# Patient Record
Sex: MALE | Race: WHITE | NOT HISPANIC OR LATINO | Employment: UNEMPLOYED | URBAN - METROPOLITAN AREA
[De-identification: names, ages, dates, MRNs, and addresses within clinical notes are randomized per-mention and may not be internally consistent; named-entity substitution may affect disease eponyms.]

---
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
## 2020-03-14 ENCOUNTER — NURSE TRIAGE (OUTPATIENT)
Dept: ADMINISTRATIVE | Facility: CLINIC | Age: 20
End: 2020-03-14

## 2020-03-14 NOTE — TELEPHONE ENCOUNTER
Spoke with pt:        Reason for Disposition   No answer.  First attempt to contact caller.  Follow-up call scheduled within 15 minutes.   Message left on unidentified voice mail.  Phone number verified.    Additional Information   Negative: Caller is angry or rude (e.g., hangs up, verbally abusive, yelling)   Negative: Caller hangs up   Negative: Caller has already spoken with the PCP and has no further questions.   Negative: Caller has already spoken with another triager and has no further questions.   Negative: Caller has already spoken with another triager or PCP AND has further questions AND triager able to answer questions.    Protocols used: NO CONTACT OR DUPLICATE CONTACT CALL-A-

## 2020-09-23 ENCOUNTER — OFFICE VISIT (OUTPATIENT)
Dept: INTERNAL MEDICINE | Facility: CLINIC | Age: 20
End: 2020-09-23
Payer: MEDICAID

## 2020-09-23 VITALS
HEART RATE: 81 BPM | HEIGHT: 69 IN | DIASTOLIC BLOOD PRESSURE: 84 MMHG | OXYGEN SATURATION: 98 % | BODY MASS INDEX: 23.71 KG/M2 | SYSTOLIC BLOOD PRESSURE: 126 MMHG | WEIGHT: 160.06 LBS | TEMPERATURE: 98 F | RESPIRATION RATE: 20 BRPM

## 2020-09-23 DIAGNOSIS — F41.9 ANXIETY: ICD-10-CM

## 2020-09-23 DIAGNOSIS — F90.8 ADHD, ADULT RESIDUAL TYPE: ICD-10-CM

## 2020-09-23 DIAGNOSIS — F84.5 ASPERGER SYNDROME: Primary | ICD-10-CM

## 2020-09-23 PROCEDURE — 99999 PR PBB SHADOW E&M-EST. PATIENT-LVL IV: CPT | Mod: PBBFAC,,, | Performed by: NURSE PRACTITIONER

## 2020-09-23 PROCEDURE — 99214 OFFICE O/P EST MOD 30 MIN: CPT | Mod: PBBFAC,PN | Performed by: NURSE PRACTITIONER

## 2020-09-23 PROCEDURE — 99204 PR OFFICE/OUTPT VISIT, NEW, LEVL IV, 45-59 MIN: ICD-10-PCS | Mod: S$PBB,,, | Performed by: NURSE PRACTITIONER

## 2020-09-23 PROCEDURE — 99204 OFFICE O/P NEW MOD 45 MIN: CPT | Mod: S$PBB,,, | Performed by: NURSE PRACTITIONER

## 2020-09-23 PROCEDURE — 99999 PR PBB SHADOW E&M-EST. PATIENT-LVL IV: ICD-10-PCS | Mod: PBBFAC,,, | Performed by: NURSE PRACTITIONER

## 2020-09-23 RX ORDER — FLUOXETINE HYDROCHLORIDE 40 MG/1
40 CAPSULE ORAL DAILY
Qty: 30 CAPSULE | Refills: 2 | Status: ON HOLD | OUTPATIENT
Start: 2020-09-23 | End: 2020-10-06 | Stop reason: SDUPTHER

## 2020-09-23 RX ORDER — CLONIDINE HYDROCHLORIDE 0.1 MG/1
1 TABLET, EXTENDED RELEASE ORAL NIGHTLY
Qty: 30 TABLET | Refills: 2 | Status: ON HOLD | OUTPATIENT
Start: 2020-09-23 | End: 2020-10-04
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
## 2020-10-03 ENCOUNTER — HOSPITAL ENCOUNTER (INPATIENT)
Facility: HOSPITAL | Age: 20
LOS: 4 days | Discharge: HOME | DRG: 885 | End: 2020-10-07
Attending: PSYCHIATRY & NEUROLOGY | Admitting: PSYCHIATRY & NEUROLOGY
Payer: MEDICAID

## 2020-10-03 DIAGNOSIS — F32.A DEPRESSION WITH SUICIDAL IDEATION: ICD-10-CM

## 2020-10-03 DIAGNOSIS — F41.9 ANXIETY: ICD-10-CM

## 2020-10-03 DIAGNOSIS — F84.5 ASPERGER SYNDROME: ICD-10-CM

## 2020-10-03 DIAGNOSIS — R45.851 DEPRESSION WITH SUICIDAL IDEATION: ICD-10-CM

## 2020-10-03 DIAGNOSIS — F33.2 MDD (MAJOR DEPRESSIVE DISORDER), RECURRENT SEVERE, WITHOUT PSYCHOSIS: Primary | ICD-10-CM

## 2020-10-03 PROCEDURE — 11400000 HC PSYCH PRIVATE ROOM

## 2020-10-03 PROCEDURE — 80061 LIPID PANEL: CPT

## 2020-10-03 PROCEDURE — 83036 HEMOGLOBIN GLYCOSYLATED A1C: CPT

## 2020-10-04 PROBLEM — R45.851 DEPRESSION WITH SUICIDAL IDEATION: Status: ACTIVE | Noted: 2020-10-04

## 2020-10-04 PROBLEM — F32.A DEPRESSION WITH SUICIDAL IDEATION: Status: ACTIVE | Noted: 2020-10-04

## 2020-10-04 PROBLEM — F33.2 MDD (MAJOR DEPRESSIVE DISORDER), RECURRENT SEVERE, WITHOUT PSYCHOSIS: Status: ACTIVE | Noted: 2020-10-04

## 2020-10-04 LAB
CHOLEST SERPL-MCNC: 135 MG/DL (ref 120–199)
CHOLEST/HDLC SERPL: 3.3 {RATIO} (ref 2–5)
ESTIMATED AVG GLUCOSE: 91 MG/DL (ref 68–131)
HBA1C MFR BLD HPLC: 4.8 % (ref 4–5.6)
HDLC SERPL-MCNC: 41 MG/DL (ref 40–75)
HDLC SERPL: 30.4 % (ref 20–50)
LDLC SERPL CALC-MCNC: 85.8 MG/DL (ref 63–159)
NONHDLC SERPL-MCNC: 94 MG/DL
TRIGL SERPL-MCNC: 41 MG/DL (ref 30–150)

## 2020-10-04 PROCEDURE — 25000003 PHARM REV CODE 250: Performed by: PSYCHIATRY & NEUROLOGY

## 2020-10-04 PROCEDURE — 99231 PR SUBSEQUENT HOSPITAL CARE,LEVL I: ICD-10-PCS | Mod: ,,, | Performed by: FAMILY MEDICINE

## 2020-10-04 PROCEDURE — 36415 COLL VENOUS BLD VENIPUNCTURE: CPT

## 2020-10-04 PROCEDURE — 99231 SBSQ HOSP IP/OBS SF/LOW 25: CPT | Mod: ,,, | Performed by: FAMILY MEDICINE

## 2020-10-04 PROCEDURE — 11400000 HC PSYCH PRIVATE ROOM

## 2020-10-04 RX ORDER — MAG HYDROX/ALUMINUM HYD/SIMETH 200-200-20
30 SUSPENSION, ORAL (FINAL DOSE FORM) ORAL EVERY 6 HOURS PRN
Status: DISCONTINUED | OUTPATIENT
Start: 2020-10-04 | End: 2020-10-07 | Stop reason: HOSPADM

## 2020-10-04 RX ORDER — OLANZAPINE 10 MG/2ML
10 INJECTION, POWDER, FOR SOLUTION INTRAMUSCULAR EVERY 4 HOURS PRN
Status: DISCONTINUED | OUTPATIENT
Start: 2020-10-04 | End: 2020-10-07 | Stop reason: HOSPADM

## 2020-10-04 RX ORDER — HYDROXYZINE PAMOATE 50 MG/1
50 CAPSULE ORAL EVERY 6 HOURS PRN
Status: DISCONTINUED | OUTPATIENT
Start: 2020-10-04 | End: 2020-10-07 | Stop reason: HOSPADM

## 2020-10-04 RX ORDER — IBUPROFEN 200 MG
1 TABLET ORAL DAILY PRN
Status: DISCONTINUED | OUTPATIENT
Start: 2020-10-04 | End: 2020-10-07 | Stop reason: HOSPADM

## 2020-10-04 RX ORDER — ACETAMINOPHEN 325 MG/1
650 TABLET ORAL EVERY 6 HOURS PRN
Status: DISCONTINUED | OUTPATIENT
Start: 2020-10-04 | End: 2020-10-07 | Stop reason: HOSPADM

## 2020-10-04 RX ORDER — LOPERAMIDE HYDROCHLORIDE 2 MG/1
2 CAPSULE ORAL
Status: DISCONTINUED | OUTPATIENT
Start: 2020-10-04 | End: 2020-10-07 | Stop reason: HOSPADM

## 2020-10-04 RX ORDER — DOCUSATE SODIUM 100 MG/1
100 CAPSULE, LIQUID FILLED ORAL DAILY PRN
Status: DISCONTINUED | OUTPATIENT
Start: 2020-10-04 | End: 2020-10-07 | Stop reason: HOSPADM

## 2020-10-04 RX ORDER — FLUOXETINE HYDROCHLORIDE 20 MG/1
40 CAPSULE ORAL DAILY
Status: DISCONTINUED | OUTPATIENT
Start: 2020-10-04 | End: 2020-10-07 | Stop reason: HOSPADM

## 2020-10-04 RX ORDER — FOLIC ACID 1 MG/1
1 TABLET ORAL DAILY
Status: DISCONTINUED | OUTPATIENT
Start: 2020-10-04 | End: 2020-10-07 | Stop reason: HOSPADM

## 2020-10-04 RX ORDER — OLANZAPINE 10 MG/1
10 TABLET ORAL EVERY 4 HOURS PRN
Status: DISCONTINUED | OUTPATIENT
Start: 2020-10-04 | End: 2020-10-07 | Stop reason: HOSPADM

## 2020-10-04 RX ADMIN — HYDROXYZINE PAMOATE 50 MG: 50 CAPSULE ORAL at 09:10

## 2020-10-04 RX ADMIN — FLUOXETINE HYDROCHLORIDE 40 MG: 20 CAPSULE ORAL at 09:10

## 2020-10-04 RX ADMIN — THERA TABS 1 TABLET: TAB at 09:10

## 2020-10-04 RX ADMIN — FOLIC ACID 1 MG: 1 TABLET ORAL at 09:10

## 2020-10-04 NOTE — H&P
"PSYCHIATRY INPATIENT ADMISSION NOTE - H & P      10/4/2020 8:23 AM   Fidel Nelson   2000   [id number]           DATE OF ADMISSION: 10/3/2020 11:05 PM    SITE: [facility]    CURRENT LEGAL STATUS: PEC and/or CEC      HISTORY    CHIEF COMPLAINT   Fidel Nelson is a 20 y.o. male with a past psychiatric history of depression, anxiety, ADHD, OCD, and Aspergers, currently admitted to the inpatient unit with the following chief complaint: depression/SI, "I had thoughts of killing myself."    HPI   (Elements: Location, Quality, Severity, Duration, Timing, Content, Modifying Factors, Associated Signs & Symptoms)    The patient was seen and examined. The chart was reviewed.    The patient presented to the ER on 10/3/20 with complaints of depression/SI. Per the ER and staff notes:  -20 yr old male to ED with c/o SI, denies HI. Patient states "has been thinking about killing himself for several months."  Patient reports "has been having relationship problems and a lot of stress at work." Patient admits to taking a Klonopin and smoking Marijuana  -Pt arriving with c/o SI, states his plan would be to cut himself or hit his head on something until he .  Reports depression, dec appetite, lack of sleep, progressively worsening over a while.   Attributes depression d/t life issue, stress, and issues with pregnant girlfriend, who kicked him out today.   States she lives with her parents and they don't lie him.   States he has attempted suicide in the past by cutting himself and hanging himself.   -Patient has a history of depression and suicidal ideation in the past.  For the last couple of days has been feeling more depressed and having suicidal thoughts with a plan.  His plan is to hang himself.  There is no modifying factors.  Duration is constant  -Pt came to unit seeking "mental help."  Pt has been having depression and SI for a couple months worsening "as more stress came."  Pt has hx of suicide attempt about 2 " "years ago by hanging.  Says he started choking and put his feet down and stopping the suicide attempt.  Denies seeking help afterwards.  Says he never even told his mother.  Admits to writing suicide noted lately. Verbal contract for safety.  Lists stressors as "about to be a father, work, financial stress, girlfriend left today and her parents don't get along with me because of my autism."  No inpt psych or o/p psych history.  Says he is on Prozac-unknown dose.  Hx ADHD, ASD-pt says aspergers, OCD, and anxiety.  Uses THC sometimes, about once every 2 months.  Uses alcohol about once every 2 months.  Drinks fireball, enough to get drunk when he does drink.  Smokes 2 cigarettes per day.  Denies HI.  Rates depression 10/10 and anxiety 10/10.  Says his body twitches when he is anxious.  Decreased sleep.  Appetite poor.     The patient was medically cleared and admitted to the [facility].     The patient reports a history of recurrent, episodic depression that started in childhood. This episode started a few months ago in the context of relationships and occupational stressors. It acutely worsened yesterday with SI after his pregnant girlfriend broke up with him.     He also reports long standing, chronic anxiety, that is near daily and has been occurring for years (+DIONICIO). This was also exacerbated by the above mentioned stressors and further exacerbates his depression. He reports a h/o OCD with chronic intrusive thoughts and severe ruminations; he used to have repetitive lock checking an light switching and possibly other compulsive behaviors which have not been prominent in several years.     +Symptoms of Depression: +diminished mood or loss of interest/anhedonia; +irritability, +diminished energy, +change in sleep, +change in appetite, +diminished concentration or cognition or indecisiveness, no PMA/R, +excessive guilt or hopelessness or worthlessness, +suicidal ideations    +Changes in Sleep: +trouble with " "initiation/maintenance, no early morning awakening with inability to return to sleep    +Suicidal/(no) Homicidal ideations: +active/passive ideations, +organized plans, no future intentions; he was planning to hang himself; +h/onear suicide attempts (tried to hang self, but then stopped at the last minute); +h/o cutting "to feel something when I was numb."    Denied past or current Symptoms of psychosis: no hallucinations, delusions, disorganized thinking, disorganized behavior or abnormal motor behavior, or negative symptoms     Denied past or current Symptoms of nisa or hypomania: no elevated, expansive, or irritable mood with no increased energy or activity; with no inflated self-esteem or grandiosity, decreased need for sleep, increased rate of speech, FOI or racing thoughts, distractibility, increased goal directed activity or PMA, or risky/disinhibited behavior    +Symptoms of DIONICIO: +excessive anxiety/worry/fear, +more days than not, +about numerous issues, +difficult to control, with +symtpoms of restlessness, fatigue, poor concentration, irritability, muscle tension, and sleep disturbance; +causes functionally impairing distress     Denied Symptoms of Panic Disorder: no recurrent panic attacks; without agoraphobia    Denied Symptoms of PTSD: no h/o trauma; no re-experiencing/intrusive symptoms, avoidant behavior, negative alterations in cognition or mood, or hyperarousal symptoms;  without dissociative symptoms     +Symptoms of OCD: +obsessions, no compulsions     Denied Symptoms of Eating Disorders: no anorexia, bulimia or binging    +h/o ADHD    Denied Substance Use: no intoxication, withdrawal, tolerance, used in larger amounts or duration than intended, unsuccessful attempts to limit or quit, increased time engaging in or seeking out, cravings or strong desire to use, failure to fulfill obligations, negative consequences in social/interpersonal/occupational,/recreational areas, use in dangerous " "situations, or medical or psychological consequences   +Nicotine abuse, "Occasional"  -uses cannabis a few time a month (no problematic usage)  -drinks a few times per year (denied negative effects secondary to drinking)        PSYCHOTHERAPY ADD-ON +[id number]   30 (16-37*) minutes    Time: 16 minutes  Participants: Met with patient    Therapeutic Intervention Type: behavior modifying psychotherapy, supportive psychotherapy  Why chosen therapy is appropriate versus another modality: relevant to diagnosis, patient responds to this modality, evidence based practice    Target symptoms: depression, anxiety   Primary focus: depression/anxiety, psychosocial stressors  Psychotherapeutic techniques: supportive and psycho-educational techniques; setting tx goals, identifying precipitants of depression    Outcome monitoring methods: self-report, observation    Patient's response to intervention:  The patient's response to intervention is accepting.    Progress toward goals:  The patient's progress toward goals is fair .            PAST PSYCHIATRIC HISTORY  Previous Psychiatric Hospitalizations: denied   Previous SI/HI: SI  Previous Suicide Attempts: gestures and near attempts   Previous Medication Trials: prozac, others pt could not name  Psychiatric Care (current & past): PCP currently  History of Psychotherapy: denied  History of Violence: as a child, none as an adult      SUBSTANCE ABUSE HISTORY   Tobacco: occasional  Alcohol: few times per year  Illicit Substances: cannabis few times per month  Misuse of Prescription Medications: denied  Detoxes: denied  Rehabs: denied  12 Step Meetings: denied  Periods of Sobriety: n.a  Withdrawal: denied        PAST MEDICAL & SURGICAL HISTORY   Past Medical History:   Diagnosis Date    ADHD (attention deficit hyperactivity disorder)     Allergic reaction     Anxiety     Asperger's disorder     Eczema     Hx of psychiatric care     OCD (obsessive compulsive disorder)     Psychiatric " problem     Scoliosis     pt says mild    Suicide attempt      Past Surgical History:   Procedure Laterality Date    HERNIA REPAIR           CURRENT MEDICATION REGIMEN   Home Meds:   Prior to Admission medications    Medication Sig Start Date End Date Taking? Authorizing Provider   FLUoxetine 40 MG capsule Take 1 capsule (40 mg total) by mouth once daily. 9/23/20 10/23/20 Yes Julieta Gregory NP   cloNIDine HCL 0.1 mg Tb12 Take 1 tablet (0.1 mg total) by mouth every evening. 9/23/20 10/23/20  Julieta Gregory NP   epinephrine (EPIPEN 2-ILANA) 0.3 mg/0.3 mL (1:1,000) AtIn Inject 0.3 mLs (0.3 mg total) into the muscle once. 3/14/14 3/14/14  Angel Purcell MD   famotidine (PEPCID) 20 MG tablet Take 2 tablets (40 mg total) by mouth 2 (two) times daily. 3/7/14 3/17/14  Angel Purcell MD         OTC Meds: none    Scheduled Meds:    folic acid  1 mg Oral Daily    multivitamin  1 tablet Oral Daily      PRN Meds: acetaminophen, aluminum-magnesium hydroxide-simethicone, docusate sodium, hydrOXYzine pamoate, loperamide, nicotine, OLANZapine **AND** OLANZapine   Psychotherapeutics (From admission, onward)    Start     Stop Route Frequency Ordered    10/04/20 0003  OLANZapine tablet 10 mg  (Olanzapine)      -- Oral Every 4 hours PRN 10/04/20 0003    10/04/20 0003  OLANZapine injection 10 mg  (Olanzapine)      -- IM Every 4 hours PRN 10/04/20 0003            ALLERGIES   Review of patient's allergies indicates:  No Known Allergies      NEUROLOGIC HISTORY  Seizures: denied   Head trauma: denied       FAMILY PSYCHIATRIC HISTORY   Family History   Problem Relation Age of Onset    Depression Mother     Anxiety disorder Mother     Alcohol abuse Father     Drug abuse Father     Bipolar disorder Maternal Grandfather     Bipolar disorder Paternal Grandmother               SOCIAL HISTORY  Developmental/Childhood: possible delays; some chaotic home environment at times   History of Physical/Sexual Abuse:  denied  Education: 11th grade, was in special education    Employment: car detailing   Financial: strained   Relationship Status/Sexual Orientation: never ; recently broke up with girlfriend of 10 months   Children: none- Girlfriend pregnant with his first child   Housing Status: with father    Mu-ism: Synagogue   History: denied   Recreational Activities: music, guitar  Access to Gun: denied       LEGAL HISTORY   Past Charges/Incarcerations:denied   Pending Charges: denied      ROS  General ROS: negative  Ophthalmic ROS: negative  ENT ROS: negative  Allergy and Immunology ROS: negative  Hematological and Lymphatic ROS: negative  Endocrine ROS: negative  Respiratory ROS: no cough, shortness of breath, or wheezing  Cardiovascular ROS: no chest pain or dyspnea on exertion  Gastrointestinal ROS: no abdominal pain, change in bowel habits, or black or bloody stools  Genito-Urinary ROS: no dysuria, trouble voiding, or hematuria  Musculoskeletal ROS: negative  Neurological ROS: no TIA or stroke symptoms  Dermatological ROS: negative      EXAMINATION      PHYSICAL EXAM  Reviewed note/exam by Dr. Larose from 10/4/20 at 8:12 AM    VITALS   Vitals:    10/04/20 0800   BP: (!) 119/59   Pulse: 70   Resp: 18   Temp: 97.5 °F (36.4 °C)      Body mass index is 21.54 kg/m².      PAIN  0/10  Subjective report of pain matches objective signs and symptoms: Yes      LABORATORY DATA   Recent Results (from the past 72 hour(s))   Urinalysis, Reflex to Urine Culture Urine, Clean Catch    Collection Time: 10/03/20  7:14 PM    Specimen: Urine   Result Value Ref Range    Specimen UA Urine, Clean Catch     Color, UA Yellow Yellow, Straw, Veronique    Appearance, UA Clear Clear    pH, UA 6.0 5.0 - 8.0    Specific Gravity, UA 1.025 1.005 - 1.030    Protein, UA Negative Negative    Glucose, UA Negative Negative    Ketones, UA 2+ (A) Negative    Bilirubin (UA) 1+ (A) Negative    Occult Blood UA Trace (A) Negative    Nitrite, UA  Negative Negative    Urobilinogen, UA 1.0 <2.0 EU/dL    Leukocytes, UA Negative Negative   Drug screen panel, emergency    Collection Time: 10/03/20  7:14 PM   Result Value Ref Range    Benzodiazepines Negative     Methadone metabolites Negative     Cocaine (Metab.) Negative     Opiate Scrn, Ur Negative     Barbiturate Screen, Ur Negative     Amphetamine Screen, Ur Negative     THC Negative     Phencyclidine Negative     Creatinine, Random Ur >450.0 (H) 23.0 - 375.0 mg/dL    Toxicology Information SEE COMMENT    COVID-19 Rapid Screening    Collection Time: 10/03/20  7:22 PM   Result Value Ref Range    SARS-CoV-2 RNA, Amplification, Qual Negative Negative   Lipid panel    Collection Time: 10/03/20  7:26 PM   Result Value Ref Range    Cholesterol 135 120 - 199 mg/dL    Triglycerides 41 30 - 150 mg/dL    HDL 41 40 - 75 mg/dL    LDL Cholesterol 85.8 63.0 - 159.0 mg/dL    Hdl/Cholesterol Ratio 30.4 20.0 - 50.0 %    Total Cholesterol/HDL Ratio 3.3 2.0 - 5.0    Non-HDL Cholesterol 94 mg/dL   CBC auto differential    Collection Time: 10/03/20  7:27 PM   Result Value Ref Range    WBC 7.41 3.90 - 12.70 K/uL    RBC 4.76 4.60 - 6.20 M/uL    Hemoglobin 15.0 14.0 - 18.0 g/dL    Hematocrit 43.4 40.0 - 54.0 %    Mean Corpuscular Volume 91 82 - 98 fL    Mean Corpuscular Hemoglobin 31.5 (H) 27.0 - 31.0 pg    Mean Corpuscular Hemoglobin Conc 34.6 32.0 - 36.0 g/dL    RDW 11.4 (L) 11.5 - 14.5 %    Platelets 234 150 - 350 K/uL    MPV 9.3 9.2 - 12.9 fL    Immature Granulocytes 0.3 0.0 - 0.5 %    Gran # (ANC) 5.4 1.8 - 7.7 K/uL    Immature Grans (Abs) 0.02 0.00 - 0.04 K/uL    Lymph # 1.6 1.0 - 4.8 K/uL    Mono # 0.4 0.3 - 1.0 K/uL    Eos # 0.0 0.0 - 0.5 K/uL    Baso # 0.02 0.00 - 0.20 K/uL    nRBC 0 0 /100 WBC    Gran% 72.7 38.0 - 73.0 %    Lymph% 20.9 18.0 - 48.0 %    Mono% 5.4 4.0 - 15.0 %    Eosinophil% 0.4 0.0 - 8.0 %    Basophil% 0.3 0.0 - 1.9 %    Differential Method Automated    Comprehensive metabolic panel    Collection Time:  "10/03/20  7:27 PM   Result Value Ref Range    Sodium 144 136 - 145 mmol/L    Potassium 3.6 3.5 - 5.1 mmol/L    Chloride 107 95 - 110 mmol/L    CO2 24 23 - 29 mmol/L    Glucose 103 70 - 110 mg/dL    BUN, Bld 12 6 - 20 mg/dL    Creatinine 1.1 0.5 - 1.4 mg/dL    Calcium 9.4 8.7 - 10.5 mg/dL    Total Protein 7.8 6.0 - 8.4 g/dL    Albumin 5.1 3.5 - 5.2 g/dL    Total Bilirubin 0.8 0.1 - 1.0 mg/dL    Alkaline Phosphatase 41 (L) 55 - 135 U/L    AST 14 10 - 40 U/L    ALT 10 10 - 44 U/L    Anion Gap 13 8 - 16 mmol/L    eGFR if African American >60 >60 mL/min/1.73 m^2    eGFR if non African American >60 >60 mL/min/1.73 m^2   TSH    Collection Time: 10/03/20  7:27 PM   Result Value Ref Range    TSH 1.248 0.400 - 4.000 uIU/mL   Ethanol    Collection Time: 10/03/20  7:27 PM   Result Value Ref Range    Alcohol, Medical, Serum <10 <10 mg/dL   Acetaminophen level    Collection Time: 10/03/20  7:27 PM   Result Value Ref Range    Acetaminophen (Tylenol), Serum <3.0 (L) 10.0 - 20.0 ug/mL   Salicylate level    Collection Time: 10/03/20  7:27 PM   Result Value Ref Range    Salicylate Lvl <5.0 (L) 15.0 - 30.0 mg/dL      No results found for: PHENYTOIN, PHENOBARB, VALPROATE, CBMZ        CONSTITUTIONAL  General Appearance: WM, normal weight, in casual attire; NAD    MUSCULOSKELETAL  Muscle Strength and Tone:  normal  Abnormal Involuntary Movements:  none  Gait and Station:  normal; non-ataxic    PSYCHIATRIC   Level of Consciousness: awake, alert  Orientation: p/p/t/s  Grooming:  adequate to circumstances  Psychomotor Behavior: no PMA/R  Speech: nl r/t/v/s  Language:  English fluent  Mood: "depressed"  Affect: decreased range, anxious, dysthymic  Thought Process:  linear and organized  Associations:  intact; no DARIN  Thought Content:  denied AVH/delusions; denied HI, +SI  Memory:  intact to recent and remote events  Attention:  intact to conversation; not distractible   Fund of Knowledge: low average for age and education   Estimate if " Intelligence: low average based on work/education history, vocabulary and mental status exam  Insight: fair- seeks help, understands/accepts illness  Judgment:  good- no bx issues, compliant and cooperative        PSYCHOSOCIAL      PSYCHOSOCIAL STRESSORS   family, financial, occupational and relationship    FUNCTIONING RELATIONSHIPS   good support system and strained with spouse or significant others      STRENGTHS AND LIABILITIES   Strength: Patient accepts guidance/feedback, Strength: Patient is expressive/articulate., Liability: Patient is unstable., Liability: Patient lacks coping skills.      Is the patient aware of the biomedical complications associated with substance abuse and mental illness? yes    Does the patient have an Advance Directive for Mental Health treatment? no  (If yes, inform patient to bring copy.)        ASSESSMENT     IMPRESSION   MDD, recurrent, severe without psychotic features  OCD  DIONICIO  h/o ADHD    Pervasive Developmental Disorder/Aspergers    Psychosocial stressors    Nicotine abuse          MEDICAL DECISION MAKING        PROBLEM LIST AND MANAGEMENT PLANS; PRESCRIPTION DRUG MANAGEMENT  Compliance: yes  Side Effects: no  Regimen Adjustments:     Depression: pt counseled  -resume home medication of prozac at 40 mg po q day; will change/optimize as indicated  -consider trial of adjunctive ADHD    OCD/DIONICIO: pt counseled  -prozac as above  -vistaril prn    H/o ADHD: pt counseled  -hold clonidine; consider trial of straterra    PDD/Aspergers: pt counseled    Psychosocial stressors: pt counseled  -SW consulted to assist with resources    Nicotine abuse: pt counseled  -nicotine patch dermal prn if needed        DIAGNOSTIC TESTING  Labs reviewed with patient; follow up pending labs    Disposition:  -SW to assist with aftercare planning and collateral  -Once stable discharge home with outpatient follow up care and/or rehab  -Continue inpatient treatment under a PEC and/or CEC for danger to self   and grave disability as evident by severe depression with SI and significant psychosocial stressors.       James Tapia MD  Psychiatry

## 2020-10-04 NOTE — NURSING
"Admit assessment completed.  Pt cooperative with skin assessment and change into paper scrubs.  Unit rules and pt rights reviewed and handouts provided.  Pt signed all paperwork.  Pt came to unit seeking "mental help."  Pt has been having depression and SI for a couple months worsening "as more stress came."  Pt has hx of suicide attempt about 2 years ago by hanging.  Says he started choking and put his feet down and stopping the suicide attempt.  Denies seeking help afterwards.  Says he never even told his mother.  Admits to writing suicide noted lately. Verbal contract for safety.  Lists stressors as "about to be a father, work, financial stress, girlfriend left today and her parents don't get along with me because of my autism."  No inpt psych or o/p psych history.  Says he is on Prozac-unknown dose.  Hx ADHD, ASD-pt says aspergers, OCD, and anxiety.  Uses THC sometimes, about once every 2 months.  Uses alcohol about once every 2 months.  Drinks fireball, enough to get drunk when he does drink.  Smokes 2 cigarettes per day.  Denies HI.  Rates depression 10/10 and anxiety 10/10.  Says his body twitches when he is anxious.  Decreased sleep.  Appetite poor.  Oriented to unit.  Questions answered.  Offered phone, but pt denied.  Given fluids and shown to bed.  No complaints voiced.  No distress noted.  Will continue to monitor for safety.  "

## 2020-10-04 NOTE — CONSULTS
History & Physical      SUBJECTIVE:     History of Present Illness:  Patient is a 20 y.o. male presents with depression and suicidal ideation. Admitted to [facility].    No past medical history other than psych. No complaints today.    PTA Medications   Medication Sig    FLUoxetine 40 MG capsule Take 1 capsule (40 mg total) by mouth once daily.    cloNIDine HCL 0.1 mg Tb12 Take 1 tablet (0.1 mg total) by mouth every evening.    epinephrine (EPIPEN 2-ILANA) 0.3 mg/0.3 mL (1:1,000) AtIn Inject 0.3 mLs (0.3 mg total) into the muscle once.    famotidine (PEPCID) 20 MG tablet Take 2 tablets (40 mg total) by mouth 2 (two) times daily.       Review of patient's allergies indicates:  No Known Allergies    Past Medical History:   Diagnosis Date    ADHD (attention deficit hyperactivity disorder)     Allergic reaction     Anxiety     Asperger's disorder     Eczema     Hx of psychiatric care     OCD (obsessive compulsive disorder)     Psychiatric problem     Scoliosis     pt says mild    Suicide attempt      Past Surgical History:   Procedure Laterality Date    HERNIA REPAIR       Family History   Problem Relation Age of Onset    Depression Mother     Anxiety disorder Mother     Alcohol abuse Father     Drug abuse Father     Bipolar disorder Maternal Grandfather     Bipolar disorder Paternal Grandmother      Social History     Tobacco Use    Smoking status: Current Some Day Smoker     Years: 1.00     Types: Cigarettes    Smokeless tobacco: Never Used   Substance Use Topics    Alcohol use: No    Drug use: Yes     Types: Marijuana     Comment: uses once every 2 months        Review of Systems:  Constitutional: no fever or chills  Respiratory: no cough or shorness of breath  Cardiovascular: no chest pain or palpitations  Gastrointestinal: no nausea or vomiting, no abdominal pain or change in bowel habits  Musculoskeletal: no arthralgias or myalgias    OBJECTIVE:     Vital Signs (Most Recent)  Temp: 97.5 °F (36.4  °C) (10/04/20 0800)  Pulse: 70 (10/04/20 0800)  Resp: 18 (10/04/20 0800)  BP: (!) 119/59 (10/04/20 0800)    Physical Exam:  General: well developed, well nourished  Lungs:  clear to auscultation bilaterally and normal respiratory effort  Cardiovascular: Heart: regular rate and rhythm, S1, S2 normal, no murmur, click, rub or gallop. Chest Wall: no tenderness. Extremities: no cyanosis or edema, or clubbing. Pulses: 2+ and symmetric.  Abdomen/Rectal: Abdomen: soft, non-tender non-distented; bowel sounds normal; no masses,  no organomegaly. Rectal: not examined  Skin: Skin color, texture, turgor normal. No rashes or lesions  Musculoskeletal:normal gait  Psych:   Neuro: Cranial nerves:  CN II Visual fields full to confrontation.   CN III, IV, VI Pupils are equal, round, and reactive to light.  CN III: no palsy  Nystagmus: none   Diplopia: none  Ophthalmoparesis: none  CN V Facial sensation intact.   CN VII Facial expression full, symmetric.   CN VIII normal.   CN IX normal.   CN X normal.   CN XI normal.   CN XII normal.      Laboratory  CBC:   Recent Labs   Lab 10/03/20  1927   WBC 7.41   RBC 4.76   HGB 15.0   HCT 43.4      MCV 91   MCH 31.5*   MCHC 34.6     CMP:   Recent Labs   Lab 10/03/20  1927      CALCIUM 9.4   ALBUMIN 5.1   PROT 7.8      K 3.6   CO2 24      BUN 12   CREATININE 1.1   ALKPHOS 41*   ALT 10   AST 14   BILITOT 0.8     Recent Labs   Lab 10/03/20  1914   COLORU Yellow   SPECGRAV 1.025   PHUR 6.0   PROTEINUA Negative   NITRITE Negative   LEUKOCYTESUR Negative   UROBILINOGEN 1.0     TSH   Date Value Ref Range Status   10/03/2020 1.248 0.400 - 4.000 uIU/mL Final     No results found for this or any previous visit.  Alcohol, Medical, Serum   Date Value Ref Range Status   10/03/2020 <10 <10 mg/dL Final     Acetaminophen (Tylenol), Serum   Date Value Ref Range Status   10/03/2020 <3.0 (L) 10.0 - 20.0 ug/mL Final     Comment:     Toxic Levels:  Adults (4 hr post-ingestion).........>150  ug/mL  Adults (12 hr post-ingestion)........>40 ug/mL  Peds (2 hr post-ingestion, liquid)...>225 ug/mL       Results for orders placed or performed during the hospital encounter of 10/03/20   Salicylate level   Result Value Ref Range    Salicylate Lvl <5.0 (L) 15.0 - 30.0 mg/dL     Results for orders placed or performed during the hospital encounter of 10/03/20   Drug screen panel, emergency   Result Value Ref Range    Benzodiazepines Negative     Methadone metabolites Negative     Cocaine (Metab.) Negative     Opiate Scrn, Ur Negative     Barbiturate Screen, Ur Negative     Amphetamine Screen, Ur Negative     THC Negative     Phencyclidine Negative     Creatinine, Random Ur >450.0 (H) 23.0 - 375.0 mg/dL    Toxicology Information SEE COMMENT      No results found for: PREGTESTUR    ASSESSMENT/PLAN:     Active Hospital Problems    Diagnosis  POA    *Depression with suicidal ideation [F32.9, R45.851]  Not Applicable    Asperger syndrome [F84.5]  Yes    Anxiety [F41.9]  Yes      Resolved Hospital Problems   No resolved problems to display.       Plan: Further orders for psych

## 2020-10-04 NOTE — PLAN OF CARE
Pt calm and cooperative. Interacting well with staff and peers. Meal and medication compliant. Watching [employer] game in dayroom with peers. Denies SI, HI and AVH. Discussed healthy coping skills and techniques. NAD. Will continue to monitor for safety.

## 2020-10-04 NOTE — NURSING
Pt up to unit per w/c, accompanied by security and RN.  Wanded, no contraband found.  Ambulated onto unit.

## 2020-10-04 NOTE — PLAN OF CARE
Pt is sleeping at this time and has slept 4.5 hours with one awakening to toilet.  Late admit, so got to bed after midnight.  NAD.  Resp even & unlabored.  Pathways clear and bed in low position.  Q 15 minute safety checks ongoing.  All precautions maintained

## 2020-10-05 PROCEDURE — 11400000 HC PSYCH PRIVATE ROOM

## 2020-10-05 PROCEDURE — 90833 PSYTX W PT W E/M 30 MIN: CPT | Mod: AF,HB,, | Performed by: PSYCHIATRY & NEUROLOGY

## 2020-10-05 PROCEDURE — 99233 PR SUBSEQUENT HOSPITAL CARE,LEVL III: ICD-10-PCS | Mod: AF,HB,, | Performed by: PSYCHIATRY & NEUROLOGY

## 2020-10-05 PROCEDURE — 90833 PR PSYCHOTHERAPY W/PATIENT W/E&M, 30 MIN (ADD ON): ICD-10-PCS | Mod: AF,HB,, | Performed by: PSYCHIATRY & NEUROLOGY

## 2020-10-05 PROCEDURE — 99233 SBSQ HOSP IP/OBS HIGH 50: CPT | Mod: AF,HB,, | Performed by: PSYCHIATRY & NEUROLOGY

## 2020-10-05 PROCEDURE — 25000003 PHARM REV CODE 250: Performed by: PSYCHIATRY & NEUROLOGY

## 2020-10-05 RX ORDER — ARIPIPRAZOLE 2 MG/1
2 TABLET ORAL DAILY
Status: DISCONTINUED | OUTPATIENT
Start: 2020-10-05 | End: 2020-10-07 | Stop reason: HOSPADM

## 2020-10-05 RX ADMIN — FLUOXETINE HYDROCHLORIDE 40 MG: 20 CAPSULE ORAL at 08:10

## 2020-10-05 RX ADMIN — ARIPIPRAZOLE 2 MG: 2 TABLET ORAL at 09:10

## 2020-10-05 RX ADMIN — HYDROXYZINE PAMOATE 50 MG: 50 CAPSULE ORAL at 07:10

## 2020-10-05 RX ADMIN — THERA TABS 1 TABLET: TAB at 08:10

## 2020-10-05 RX ADMIN — FOLIC ACID 1 MG: 1 TABLET ORAL at 08:10

## 2020-10-05 NOTE — PLAN OF CARE
Recommendation:   1. Regular Diet appropriate   2. RD to monitor for changes in intake and weight    Interventions:  General healthful diet  Collaboration with other providers    Goals: meet at least 85% EEN/EPN with meals and snacks by f/u  Nutrition Goal Status: new    Nutrition Discharge Planning: regular diet

## 2020-10-05 NOTE — PLAN OF CARE
"TREATMENT TEAM      History of Present Illness   Fidel Nelson is a 20-year-old. male with a past psychiatric history of depression, anxiety, ADHD, OCD, and Asperger's. The patient is currently admitted to the inpatient unit with the following chief complaint: depression/ suicidal ideations. He stated to ER staff, "I had thoughts of killing myself." Per the ER and staff notes: 20-year-old male to ED with c/o SI, denies HI. Patient states "has been thinking about killing himself for several months."  Patient reports "has been having relationship problems and a lot of stress at work." Patient admits to taking a Klonopin and smoking Marijuana  -Pt arriving with c/o SI, states his plan would be to cut himself or hit his head on something until he .  Reports depression, decreased appetite, lack of sleep, progressively worsening over a while. Attributes depression to life issues, stress, and issues with pregnant girlfriend, who kicked him out today.   States she lives with her parents and they don't like him.   States he has attempted suicide in the past by cutting himself and hanging himself.     Current:  The patient is dressed in hospital scrubs and wearing a mask. He stated that he is feeling a little anxious. He stated that he came to the hospital to get help for suicidal ideations due to relationship problems, financial problems, and no one understanding him. His family thinks that he is crazy which he denies. They are saying that he is not his self. He will live with his father when he leaves the hospital. He will have an outpatient appointment at [facility]. He has a job. He has his mother and aunts as a support system. The doctor spoke to him about medication and that his stay would be about a week.     Plan:  The psychiatrist will adjust medications as needed. Staff will engage the patient in groups and/or 1:1s for coping skill enhancement and social skill development. Nursing will engage the " patient education and administer medications as needed.

## 2020-10-05 NOTE — PROGRESS NOTES
"   10/05/20 1144   Assessment   Patient's Identification of the Problem Patient presents depressed, anxious and reports "alright, little anxious" mood. Patient states his admit is due to "I came here basically for mntal health, suicide tendencies, anxiety, Aspergers Syndrome so I can be a better man, father and future ." Patient reports relationship stressors, financial stressors, occupational stressors and feeling alone. Patient states "I feel stuck, like I have no way out, like I can't move forward." Patient admits to negative leisure lifestyle of cigarettes(occasionall), alcohol(few times a year), THC( rare). 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 Patient verbalized main goal is to get help for mental health issues.   Leisure Interest Watching TV;Exercise;Listen to Music;Sit Outside;Cooking;Fishing;Enjoy Family/Friends;Video Games;Play Instrument   Leisure Barriers Loss of Interest;Cognitive Skill Level;Lack of Finances;Fears/Phobias;Other (See Comments)  (fear driving)   Treatment Focus To Improve Mood;To Improve Leisure Awareness/Lifestyle/Interest;To Promote Successful and Safe Self Expression;To Improve Coping Skills     Treatment Recommendation:   1:1 Intervention (as needed)    Cognitive Stimulation Skilled Activity  Creative Expression Skilled Activity  Mild Exercises Skilled Activity  Stress Management Skilled Activity  Coping Skilled Activity  Leisure Education and Awareness Skilled Activity    Treatment Goal(s):  Long Term Goals Refer To Master Treatment Plan    Short Term Treatment Goal(s)  Patient Will:  Exhibit Improvement in Mood  Demonstrate Constructive Expression of Feelings and Behavior  Identify at Least 2 Coping Skills or Leisure Skills to Reduce Depression and Hopelessness Upon Request from Therapist    Discharge Recommendations:  Encourage Patient to Actively Utilize " Available Community Resources to Increase Leisure Involvement to Decrease Signs and Symptoms of Illness  Encourage Patient to Utilize Coping Skills on a Regular Basis to Reduce the Risk of Decompensating and Re-Hospitalizations  Follow Up with After Care Appointments  Continue with Current Leisure Activities

## 2020-10-05 NOTE — PLAN OF CARE
Pt has been out of room interacting with peers.  No outbursts or complaints.  No distress noted.  Denies SI/HI/hallucinations.  Rates depression 8/10 and anxiety 6/10.  Says had an alright day.  Talked on phone to girlfriends sister who told him to give her time and everything will work out.  He also talked with his mom and dad.  Vistaril given po for complaint of insomnia.  Ate snacks.  Will continue to monitor for safety.

## 2020-10-05 NOTE — CONSULTS
"  [facility]  Adult Nutrition  Consult Note    SUMMARY     Recommendations    Recommendation:   1. Regular Diet appropriate   2. RD to monitor for changes in intake and weight    Interventions:  General healthful diet  Collaboration with other providers    Goals: meet at least 85% EEN/EPN with meals and snacks by f/u  Nutrition Goal Status: new  Communication of RD Recs: (POC)    Reason for Assessment    Reason For Assessment: consult  Diagnosis: psychological disorder  Relevant Medical History: ADHD, anxiety, Asperger's disorder, OCD    General Information Comments: Pt has consumed 100% of meals and snacks since admit. Graphs indicate a 16lb (9%) weight loss in <5 months. Pt reported he had been experiencing a decrease in appetite. NFPE not completed per psych status, pt is at risk for malnutrition 2/2 significant weight loss.     Nutrition Discharge Planning: regular diet    Nutrition Risk Screen    Nutrition Risk Screen: no indicators present    Nutrition/Diet History    Food Allergies: NKFA  Factors Affecting Nutritional Intake: depression, decreased appetite    Anthropometrics    Temp: 98.2 °F (36.8 °C)  Height Method: Stated  Height: 5' 11" (180.3 cm)  Height (inches): 71 in  Weight Method: Standard Scale  Weight: 70.1 kg (154 lb 6.9 oz)  Weight (lb): 154.43 lb  Ideal Body Weight (IBW), Male: 172 lb  % Ideal Body Weight, Male (lb): 90.1 %  BMI (Calculated): 21.5  BMI Grade: 18.5-24.9 - normal  Weight Change Amount: 16 lb     Lab/Procedures/Meds    Pertinent Labs Reviewed: reviewed  Pertinent Labs Comments: alk phos 41  Pertinent Medications Reviewed: reviewed  Pertinent Medications Comments: folic acid, MVI    Estimated/Assessed Needs    Weight Used For Calorie Calculations: 70.1 kg (154 lb 8.7 oz)  Energy Calorie Requirements (kcal): 2400  Energy Need Method: [place]fr[place]-St Precious(*1.4)  Protein Requirements: 56-70 g(.8-1 g/kg)  Weight Used For Protein Calculations: 70.1 kg (154 lb 8.7 oz)   "   Estimated Fluid Requirement Method: RDA Method  RDA Method (mL): 2400     Nutrition Prescription Ordered    Current Diet Order: Regular Diet    Evaluation of Received Nutrient/Fluid Intake    Fluid Required: meeting needs  % Intake of Estimated Energy Needs: 75 - 100 %  % Meal Intake: 75 - 100 %    Nutrition Risk    Level of Risk/Frequency of Follow-up: low - moderate     Assessment and Plan  Nutrition Problem:  Unintended weight loss    Related to (etiology):   depression    Signs and Symptoms (as evidenced by):   9% weight loss in <5 months, reported decrease in appetite    Interventions:  See above    Nutrition Diagnosis Status:   New    Monitor and Evaluation    Food and Nutrient Intake: energy intake  Food and Nutrient Adminstration: diet order  Anthropometric Measurements: weight change  Nutrition-Focused Physical Findings: overall appearance     Malnutrition Assessment  Malnutrition Type: (not enough evidence for malnutrition dx at this time)          Weight Loss (Malnutrition): (9% in <5 months)     Nutrition Follow-Up    RD Follow-up?: Yes

## 2020-10-05 NOTE — PROGRESS NOTES
"   10/05/20 1040   [facility] Group Therapy   Group Name Therapeutic Recreation   Specific Interventions Cognitive Stimulation Training  (healthy leisure outlets)   Participation Level Appropriate   Participation Quality Cooperative   Insight/Motivation Improved   Affect/Mood Display Depressed   Cognition Alert   Psychomotor WNL   Patient reports feeling "a little anxious" and states "I usually take all my frustration out by yelling."  "

## 2020-10-05 NOTE — PROGRESS NOTES
"PSYCHIATRY DAILY INPATIENT PROGRESS NOTE  SUBSEQUENT HOSPITAL VISIT    ENCOUNTER DATE: 10/5/2020  [facility]    DATE OF ADMISSION: 10/3/2020 11:05 PM  LENGTH OF STAY: 2 days      HISTORY    CHIEF COMPLAINT   Fidel Nelson is a 20 y.o. male, seen during daily manley rounds on the inpatient unit.  Fidel Nelson presents with the chief complaint of depression/SI, "I had thoughts of killing myself."    HPI   (Elements: Location, Quality, Severity, Duration, Timing, Content, Modifying Factors, Associated Signs & Symptoms)    The patient was seen and examined. The chart was reviewed.     Reviewed notes by LPN, RNs, RD, and MD from the last 24 hours.    The patient's case was discussed with the treatment team and care providers today, including RNs, CTRS, and LMSW.    Staff reports no behavioral or management issues.     The patient has been compliant with treatment. The patient denied any side effects.    The patient remains focused on getting help for my "mental issues." He is able to discuss his precipitating stressors which include relationship stressors, financial stressors, occupational stressors and feelings of isolation.     Symptoms persist minimally changed from yesterday as documented below.     Continue Symptoms of Depression: +diminished mood or loss of interest/anhedonia; +irritability, +diminished energy, +change in sleep, +change in appetite, +diminished concentration or cognition or indecisiveness, no PMA/R, +excessive guilt or hopelessness or worthlessness, +suicidal ideations     Improving Changes in Sleep: no trouble with initiation/maintenance, no early morning awakening with inability to return to sleep     Continued Suicidal/(no) Homicidal ideations: +active/passive ideations, +organized plans, no future intentions; he was planning to hang himself     Denied past or current Symptoms of psychosis: no hallucinations, delusions, disorganized thinking, disorganized behavior or abnormal motor " behavior, or negative symptoms      Denied past or current Symptoms of nisa or hypomania: no elevated, expansive, or irritable mood with no increased energy or activity; with no inflated self-esteem or grandiosity, decreased need for sleep, increased rate of speech, FOI or racing thoughts, distractibility, increased goal directed activity or PMA, or risky/disinhibited behavior     Continued Symptoms of DIONICIO: +excessive anxiety/worry/fear, +more days than not, +about numerous issues, +difficult to control, with +symtpoms of restlessness, fatigue, poor concentration, irritability, muscle tension, and sleep disturbance; +causes functionally impairing distress      Continued Symptoms of OCD: +obsessions, no compulsions     PSYCHOTHERAPY ADD-ON +[id number]   30 (16-37*) minutes    Time: 16 minutes  Participants: Met with patient    Therapeutic Intervention Type: behavior modifying psychotherapy, supportive psychotherapy  Why chosen therapy is appropriate versus another modality: relevant to diagnosis, patient responds to this modality, evidence based practice    Target symptoms: depression, anxiety   Primary focus: depression, psychosocial stressors  Psychotherapeutic techniques: supportive and problem solving techniques; psycho-education    Outcome monitoring methods: self-report, observation    Patient's response to intervention:  The patient's response to intervention is accepting.    Progress toward goals:  The patient's progress toward goals is fair .          ROS  General ROS: negative  Ophthalmic ROS: negative  ENT ROS: negative  Allergy and Immunology ROS: negative  Hematological and Lymphatic ROS: negative  Endocrine ROS: negative  Respiratory ROS: no cough, shortness of breath, or wheezing  Cardiovascular ROS: no chest pain or dyspnea on exertion  Gastrointestinal ROS: no abdominal pain, change in bowel habits, or black or bloody stools  Genito-Urinary ROS: no dysuria, trouble voiding, or hematuria  Musculoskeletal  "ROS: negative  Neurological ROS: no TIA or stroke symptoms  Dermatological ROS: negative    PAST MEDICAL HISTORY   Past Medical History:   Diagnosis Date    ADHD (attention deficit hyperactivity disorder)     Allergic reaction     Anxiety     Asperger's disorder     Eczema     Hx of psychiatric care     OCD (obsessive compulsive disorder)     Psychiatric problem     Scoliosis     pt says mild    Suicide attempt            PSYCHOTROPIC MEDICATIONS   Scheduled Meds:   FLUoxetine  40 mg Oral Daily    folic acid  1 mg Oral Daily    multivitamin  1 tablet Oral Daily     Continuous Infusions:  PRN Meds:.acetaminophen, aluminum-magnesium hydroxide-simethicone, docusate sodium, hydrOXYzine pamoate, loperamide, nicotine, OLANZapine **AND** OLANZapine        EXAMINATION    VITALS   Vitals:    10/05/20 0807   BP: 111/64   Pulse: 76   Resp: 18   Temp: 98.2 °F (36.8 °C)     Body mass index is 21.54 kg/m².      CONSTITUTIONAL  General Appearance: WM, normal weight, in casual attire; NAD     MUSCULOSKELETAL  Muscle Strength and Tone:  normal  Abnormal Involuntary Movements:  none  Gait and Station:  normal; non-ataxic     PSYCHIATRIC   Level of Consciousness: awake, alert  Orientation: p/p/t/s  Grooming:  adequate to circumstances  Psychomotor Behavior: no PMA/R  Speech: nl r/t/v/s  Language:  English fluent  Mood: "strill depressed"  Affect: decreased range, anxious, dysthymic  Thought Process:  linear and organized  Associations:  intact; no DARIN  Thought Content:  denied AVH/delusions; denied HI, +SI  Memory:  intact to recent and remote events  Attention:  intact to conversation; not distractible   Fund of Knowledge: low average for age and education   Estimate if Intelligence: low average based on work/education history, vocabulary and mental status exam  Insight: fair- seeks help, understands/accepts illness  Judgment:  good- no bx issues, compliant and cooperative         DIAGNOSTIC TESTING   Laboratory " Results  No results found for this or any previous visit (from the past 24 hour(s)).      ASSESSMENT      IMPRESSION   MDD, recurrent, severe without psychotic features  OCD  DIONICIO  h/o ADHD     Pervasive Developmental Disorder/Aspergers     Psychosocial stressors     Nicotine abuse              MEDICAL DECISION MAKING          PROBLEM LIST AND MANAGEMENT PLANS; PRESCRIPTION DRUG MANAGEMENT  Compliance: yes  Side Effects: no  Regimen Adjustments:      Depression: pt counseled  -resumed/continue home medication of prozac at 40 mg po q day; will continue to change/optimize as indicated  -Start trial of adjunctive Abilify 2 mg po q day     OCD/DIONICIO: pt counseled  -prozac as above  -vistaril prn     H/o ADHD: pt counseled  -hold clonidine; consider trial of straterra     PDD/Aspergers: pt counseled     Psychosocial stressors: pt counseled  -SW consulted and assisting with resources     Nicotine abuse: pt counseled  -nicotine patch dermal prn if needed         DIAGNOSTIC TESTING  Labs reviewed with patient; follow up pending labs     Disposition:  -SW to assist with aftercare planning and collateral  -Once stable discharge home with outpatient follow up care and/or rehab  -Continue inpatient treatment under a PEC and/or CEC for danger to self  and grave disability as evident by severe depression with SI and significant psychosocial stressors.     NEED FOR CONTINUED HOSPITALIZATION  Psychiatric illness continues to pose a potential threat to life or bodily function, of self or others, thereby requiring the need for continued inpatient psychiatric hospitalization: Yes    Protective inpatient pyschiatric hospitalization required while a safe disposition plan is enacted: Yes    Patient stabilized and ready for discharge from inpatient psychiatric unit: No      STAFF:   James Tapia MD  Psychiatry

## 2020-10-05 NOTE — PLAN OF CARE
Behavior:  Patient attended and participated in psychotherapy group today. He was dressed in hospital scrubs and wearing a mask.    Intervention:  The Five Stages of Change was discussed in psychotherapy group this date. Patients identified what stages of change they believed that they are in using handouts P/C/P -1.2 and P/C/P - 1.1 from the Group Therapy for Substance Abuse, second edition, 2016. A Stages of Change Manual. Group discussion was then had about whether patients had a change in their life that they were thinking of making and what stage of change they are in.    Response:  The patient stated that he is in the preparation stage because he can see the benefits of quitting his behavior. He is planning to go to anger management classes through outpatient counseling.    Plan:    To continue to encourage patient to attend group psychotherapy and to learn more about the Five Stages of Change.

## 2020-10-05 NOTE — PLAN OF CARE
Pt has slept 6.5 hours so far without any interruptions.  NAD.  Resp even & unlabored.  Pathways clear and bed is low.  Q 15 minute safety checks ongoing.  All precautions maintained.

## 2020-10-06 PROBLEM — R45.851 DEPRESSION WITH SUICIDAL IDEATION: Status: RESOLVED | Noted: 2020-10-04 | Resolved: 2020-10-06

## 2020-10-06 PROBLEM — F32.A DEPRESSION WITH SUICIDAL IDEATION: Status: RESOLVED | Noted: 2020-10-04 | Resolved: 2020-10-06

## 2020-10-06 PROCEDURE — 99233 PR SUBSEQUENT HOSPITAL CARE,LEVL III: ICD-10-PCS | Mod: AF,HB,, | Performed by: PSYCHIATRY & NEUROLOGY

## 2020-10-06 PROCEDURE — 90833 PSYTX W PT W E/M 30 MIN: CPT | Mod: AF,HB,, | Performed by: PSYCHIATRY & NEUROLOGY

## 2020-10-06 PROCEDURE — 90833 PR PSYCHOTHERAPY W/PATIENT W/E&M, 30 MIN (ADD ON): ICD-10-PCS | Mod: AF,HB,, | Performed by: PSYCHIATRY & NEUROLOGY

## 2020-10-06 PROCEDURE — 99233 SBSQ HOSP IP/OBS HIGH 50: CPT | Mod: AF,HB,, | Performed by: PSYCHIATRY & NEUROLOGY

## 2020-10-06 PROCEDURE — 11400000 HC PSYCH PRIVATE ROOM

## 2020-10-06 PROCEDURE — 25000003 PHARM REV CODE 250: Performed by: PSYCHIATRY & NEUROLOGY

## 2020-10-06 RX ORDER — IBUPROFEN 200 MG
1 TABLET ORAL DAILY
COMMUNITY
Start: 2020-10-06

## 2020-10-06 RX ORDER — FLUOXETINE HYDROCHLORIDE 40 MG/1
40 CAPSULE ORAL DAILY
Qty: 30 CAPSULE | Refills: 2 | Status: SHIPPED | OUTPATIENT
Start: 2020-10-06 | End: 2020-11-05

## 2020-10-06 RX ORDER — ARIPIPRAZOLE 2 MG/1
2 TABLET ORAL DAILY
Qty: 30 TABLET | Refills: 2 | Status: SHIPPED | OUTPATIENT
Start: 2020-10-07 | End: 2021-10-07

## 2020-10-06 RX ADMIN — THERA TABS 1 TABLET: TAB at 08:10

## 2020-10-06 RX ADMIN — FOLIC ACID 1 MG: 1 TABLET ORAL at 08:10

## 2020-10-06 RX ADMIN — FLUOXETINE HYDROCHLORIDE 40 MG: 20 CAPSULE ORAL at 08:10

## 2020-10-06 RX ADMIN — ARIPIPRAZOLE 2 MG: 2 TABLET ORAL at 08:10

## 2020-10-06 NOTE — PLAN OF CARE
Lying quiet in bed, eyes closed, respiration even and unlabored, appearing asleep since 2100.  Slept well all shift.  Safety and precautions maintained with rounds every 15 minutes, bed is fixed in a low position and pathways kept clear.  No fall occurred.

## 2020-10-06 NOTE — PLAN OF CARE
Plan of care reviewed.  Denies intent to harm self or others at this time.  Gait steady, no falls.   interacting with staff  to make needs known but otherwise stayed in room. Pt states he is doing good and voiced no complaints. Promoted an individualized safety plan, reality-based interactions, effective coping strategies, and impulse control.  Will continue to monitor for safety.

## 2020-10-06 NOTE — PROGRESS NOTES
10/06/20 1100   [facility] Group Therapy   Group Name Leisure Education   Specific Interventions Cognitive Stimulation Training  (benefits of leisure activities)   Participation Level Appropriate;Sharing   Participation Quality Cooperative;Requires Prompting   Insight/Motivation Improved   Affect/Mood Display Appropriate   Cognition Alert   Psychomotor WNL   Patient list benefits for participating in leisure activities. Patient reports he play games and watch TV to relax, listen to music for mental stimulation, lift weights for physical fitness and meditate to be alone.

## 2020-10-06 NOTE — PLAN OF CARE
Pt is calm and cooperative.  Denies any S/I or H/I at this time.  Mood improving.  No acute distress apparent at this time, will continue to monitor.

## 2020-10-07 VITALS
SYSTOLIC BLOOD PRESSURE: 128 MMHG | TEMPERATURE: 98 F | WEIGHT: 155.63 LBS | DIASTOLIC BLOOD PRESSURE: 72 MMHG | RESPIRATION RATE: 18 BRPM | HEIGHT: 71 IN | HEART RATE: 63 BPM | BODY MASS INDEX: 21.79 KG/M2

## 2020-10-07 PROCEDURE — 25000003 PHARM REV CODE 250: Performed by: PSYCHIATRY & NEUROLOGY

## 2020-10-07 PROCEDURE — 90833 PSYTX W PT W E/M 30 MIN: CPT | Mod: AF,HB,, | Performed by: PSYCHIATRY & NEUROLOGY

## 2020-10-07 PROCEDURE — 99239 HOSP IP/OBS DSCHRG MGMT >30: CPT | Mod: AF,HB,, | Performed by: PSYCHIATRY & NEUROLOGY

## 2020-10-07 PROCEDURE — 99239 PR HOSPITAL DISCHARGE DAY,>30 MIN: ICD-10-PCS | Mod: AF,HB,, | Performed by: PSYCHIATRY & NEUROLOGY

## 2020-10-07 PROCEDURE — 90833 PR PSYCHOTHERAPY W/PATIENT W/E&M, 30 MIN (ADD ON): ICD-10-PCS | Mod: AF,HB,, | Performed by: PSYCHIATRY & NEUROLOGY

## 2020-10-07 RX ADMIN — THERA TABS 1 TABLET: TAB at 08:10

## 2020-10-07 RX ADMIN — ARIPIPRAZOLE 2 MG: 2 TABLET ORAL at 08:10

## 2020-10-07 RX ADMIN — FLUOXETINE HYDROCHLORIDE 40 MG: 20 CAPSULE ORAL at 08:10

## 2020-10-07 RX ADMIN — FOLIC ACID 1 MG: 1 TABLET ORAL at 08:10

## 2020-10-07 NOTE — NURSING
Discharge note : patient is cooperative . avs reviewed with patient and he expresses understanding . Family will bring him home . Has all belongings . Denies suicidal , homicidal ideations and is not gravely disabled . Patient will follow up with [facility] . At [address] [place] [address] arjun [email or web address] [phone]. phone is [phone]. Appointment is on Monday October 12, 2020. At 8 am in person . Patient does not use tobacco products and had a negative uds on admit . avs faxed on 10/7/ 20 at 2 pm .

## 2020-10-07 NOTE — PROGRESS NOTES
"   10/07/20 1230   [facility] Group Therapy   Group Name Therapeutic Recreation   Specific Interventions Other (see comments)  (1:1 with staff)   Participation Level Appropriate   Participation Quality Cooperative   Insight/Motivation Good   Affect/Mood Display Appropriate   Cognition Alert   Psychomotor WNL   Patient states he did not attend group because "I was tired." Patient reports an "alright" mood and states "my anxiety and depression has decreased, the medicine and therapy is helping." Patient encouraged to go to follow-up appointments and participate in healthy leisure outlets.  "

## 2020-10-07 NOTE — PROGRESS NOTES
PSYCHOTHERAPY ADD-ON +[id number]   30 (16-37*) minutes     Time: 16 minutes  Participants: Met with patient     Therapeutic Intervention Type: behavior modifying psychotherapy, supportive psychotherapy  Why chosen therapy is appropriate versus another modality: relevant to diagnosis, patient responds to this modality, evidence based practice     Target symptoms: depression, anxiety   Primary focus: depression, psychosocial stressors  Psychotherapeutic techniques: supportive and problem solving techniques; psycho-education; isnight oriented techniques; safety plan and wrap up session      Outcome monitoring methods: self-report, observation     Patient's response to intervention:  The patient's response to intervention is accepting.     Progress toward goals:  The patient's progress toward goals is good.    James Tapia MD  Psychiatry

## 2020-10-07 NOTE — DISCHARGE SUMMARY
"Discharge Summary  Psychiatry    Admit Date: 10/3/2020    Discharge Date and Time:  10/07/2020 9:45 AM    Attending Physician: James Tapia MD     Discharge Provider: James Tapia MD    Reason for Admission:  depression/SI, "I had thoughts of killing myself."    History of Present Illness:   The patient presented to the ER on 10/3/20 with complaints of depression/SI. Per the ER and staff notes:  -20 yr old male to ED with c/o SI, denies HI. Patient states "has been thinking about killing himself for several months."  Patient reports "has been having relationship problems and a lot of stress at work." Patient admits to taking a Klonopin and smoking Marijuana  -Pt arriving with c/o SI, states his plan would be to cut himself or hit his head on something until he .  Reports depression, dec appetite, lack of sleep, progressively worsening over a while.   Attributes depression d/t life issue, stress, and issues with pregnant girlfriend, who kicked him out today.   States she lives with her parents and they don't lie him.   States he has attempted suicide in the past by cutting himself and hanging himself.   -Patient has a history of depression and suicidal ideation in the past.  For the last couple of days has been feeling more depressed and having suicidal thoughts with a plan.  His plan is to hang himself.  There is no modifying factors.  Duration is constant  -Pt came to unit seeking "mental help."  Pt has been having depression and SI for a couple months worsening "as more stress came."  Pt has hx of suicide attempt about 2 years ago by hanging.  Says he started choking and put his feet down and stopping the suicide attempt.  Denies seeking help afterwards.  Says he never even told his mother.  Admits to writing suicide noted lately. Verbal contract for safety.  Lists stressors as "about to be a father, work, financial stress, girlfriend left today and her parents don't get along with me because " "of my autism."  No inpt psych or o/p psych history.  Says he is on Prozac-unknown dose.  Hx ADHD, ASD-pt says aspergers, OCD, and anxiety.  Uses THC sometimes, about once every 2 months.  Uses alcohol about once every 2 months.  Drinks fireball, enough to get drunk when he does drink.  Smokes 2 cigarettes per day.  Denies HI.  Rates depression 10/10 and anxiety 10/10.  Says his body twitches when he is anxious.  Decreased sleep.  Appetite poor.     The patient was medically cleared and admitted to the [facility].      The patient reports a history of recurrent, episodic depression that started in childhood. This episode started a few months ago in the context of relationships and occupational stressors. It acutely worsened yesterday with SI after his pregnant girlfriend broke up with him.      He also reports long standing, chronic anxiety, that is near daily and has been occurring for years (+DIONICIO). This was also exacerbated by the above mentioned stressors and further exacerbates his depression. He reports a h/o OCD with chronic intrusive thoughts and severe ruminations; he used to have repetitive lock checking an light switching and possibly other compulsive behaviors which have not been prominent in several years.      +Symptoms of Depression: +diminished mood or loss of interest/anhedonia; +irritability, +diminished energy, +change in sleep, +change in appetite, +diminished concentration or cognition or indecisiveness, no PMA/R, +excessive guilt or hopelessness or worthlessness, +suicidal ideations     +Changes in Sleep: +trouble with initiation/maintenance, no early morning awakening with inability to return to sleep     +Suicidal/(no) Homicidal ideations: +active/passive ideations, +organized plans, no future intentions; he was planning to hang himself; +h/onear suicide attempts (tried to hang self, but then stopped at the last minute); +h/o cutting "to feel something when I was numb."     Denied past or current " "Symptoms of psychosis: no hallucinations, delusions, disorganized thinking, disorganized behavior or abnormal motor behavior, or negative symptoms      Denied past or current Symptoms of nisa or hypomania: no elevated, expansive, or irritable mood with no increased energy or activity; with no inflated self-esteem or grandiosity, decreased need for sleep, increased rate of speech, FOI or racing thoughts, distractibility, increased goal directed activity or PMA, or risky/disinhibited behavior     +Symptoms of DIONICIO: +excessive anxiety/worry/fear, +more days than not, +about numerous issues, +difficult to control, with +symtpoms of restlessness, fatigue, poor concentration, irritability, muscle tension, and sleep disturbance; +causes functionally impairing distress      Denied Symptoms of Panic Disorder: no recurrent panic attacks; without agoraphobia     Denied Symptoms of PTSD: no h/o trauma; no re-experiencing/intrusive symptoms, avoidant behavior, negative alterations in cognition or mood, or hyperarousal symptoms;  without dissociative symptoms      +Symptoms of OCD: +obsessions, no compulsions      Denied Symptoms of Eating Disorders: no anorexia, bulimia or binging     +h/o ADHD     Denied Substance Use: no intoxication, withdrawal, tolerance, used in larger amounts or duration than intended, unsuccessful attempts to limit or quit, increased time engaging in or seeking out, cravings or strong desire to use, failure to fulfill obligations, negative consequences in social/interpersonal/occupational,/recreational areas, use in dangerous situations, or medical or psychological consequences   +Nicotine abuse, "Occasional"  -uses cannabis a few time a month (no problematic usage)  -drinks a few times per year (denied negative effects secondary to drinking)       Procedures Performed: * No surgery found *    Hospital Course (synopsis of major diagnoses, care, treatment, and services provided during the course of the " hospital stay):   The patient was stabilized and discharged on the following medications:  Depression: pt counseled  -resumed/continue home medication of prozac at 40 mg po q day; will continue to change/optimize as indicated  -Started/continue trial of adjunctive Abilify 2 mg po q day     OCD/DIONICIO: pt counseled  -prozac as above  -vistaril prn     H/o ADHD: pt counseled  -hold clonidine; consider trial of straterra     PDD/Aspergers: pt counseled     Psychosocial stressors: pt counseled  -SW consulted and assisted with resources     Nicotine abuse: pt counseled          The patient was compliant with treatment. The patient denied any side effects.     The patient remains focused on getting help and desires to continue care as an outpatient. He is able to discuss his precipitating stressors which include relationship stressors, financial stressors, occupational stressors and feelings of isolation; he is fully able to discuss solutions to his stressors.      Symptoms are improved from yesterday as documented below. He feels hopeful and future oriented and goal directed. He readily discusses his short and long term goals    He is currently stable and able/willing to attend outpatient treatment     Improved Symptoms of Depression: no diminished mood or loss of interest/anhedonia; no irritability, no diminished energy, no change in sleep, no change in appetite, no diminished concentration or cognition or indecisiveness, no PMA/R, no excessive guilt or hopelessness or worthlessness, no suicidal ideations     Improved Changes in Sleep: no trouble with initiation/maintenance, no early morning awakening with inability to return to sleep     Improved Suicidal/(no) Homicidal ideations: no active/passive ideations, no organized plans, no future intentions     Denied past or current Symptoms of psychosis: no hallucinations, delusions, disorganized thinking, disorganized behavior or abnormal motor behavior, or negative  "symptoms      Denied past or current Symptoms of nisa or hypomania: no elevated, expansive, or irritable mood with no increased energy or activity; with no inflated self-esteem or grandiosity, decreased need for sleep, increased rate of speech, FOI or racing thoughts, distractibility, increased goal directed activity or PMA, or risky/disinhibited behavior     Improved Symptoms of DIONICIO: no excessive anxiety/worry/fear, with no symtpoms of restlessness, fatigue, poor concentration, irritability, muscle tension, and sleep disturbance     Improved Symptoms of OCD: less obsessions, no compulsions      Discussed diagnosis, risks and benefits of proposed treatment vs alternative treatments vs no treatment, and potential side effects of these treatments.  The patient expresses understanding of the above and displays the capacity to agree with this treatment given said understanding.  Patient also agrees that, currently, the benefits outweigh the risks and would like to pursue treatment at this time.    MSE:   CONSTITUTIONAL  General Appearance: WM, normal weight, in casual attire; NAD     MUSCULOSKELETAL  Muscle Strength and Tone:  normal  Abnormal Involuntary Movements:  none  Gait and Station:  normal; non-ataxic     PSYCHIATRIC   Level of Consciousness: awake, alert  Orientation: p/p/t/s  Grooming:  adequate to circumstances  Psychomotor Behavior: no PMA/R  Speech: nl r/t/v/s  Language:  English fluent  Mood: "much better"  Affect: improved range, not currently  anxious/dysthymic- improved/wuthymic  Thought Process:  linear and organized; goal directed  Associations:  intact; no DARIN  Thought Content:  denied AVH/delusions; denied HI, no SI  Memory:  intact to recent and remote events  Attention:  intact to conversation; not distractible   Fund of Knowledge: low average for age and education   Estimate if Intelligence: low average based on work/education history, vocabulary and mental status exam  Insight: good/improving- " seeks help, understands/accepts illness  Judgment:  good- no bx issues, compliant and cooperative         Tobacco Usage:  Is patient a smoker? Yes  Does patient want prescription for Tobacco Cessation? No  Does patient want counseling for Tobacco Cessation? No    If patient would like to quit, then over the counter nicotine patch could be used. The patient could also follow up with his PCP or psychiatric provider for other alternatives.     Final Diagnoses:    Principal Problem: MDD, recurrent, severe without psychotic features   Secondary Diagnoses:   OCD  DIONICIO  h/o ADHD     Pervasive Developmental Disorder/Aspergers     Psychosocial stressors     Nicotine abuse    Labs:  Admission on 10/03/2020   Component Date Value Ref Range Status    Cholesterol 10/03/2020 135  120 - 199 mg/dL Final    Triglycerides 10/03/2020 41  30 - 150 mg/dL Final    HDL 10/03/2020 41  40 - 75 mg/dL Final    LDL Cholesterol 10/03/2020 85.8  63.0 - 159.0 mg/dL Final    Hdl/Cholesterol Ratio 10/03/2020 30.4  20.0 - 50.0 % Final    Total Cholesterol/HDL Ratio 10/03/2020 3.3  2.0 - 5.0 Final    Non-HDL Cholesterol 10/03/2020 94  mg/dL Final    Hemoglobin A1C 10/03/2020 4.8  4.0 - 5.6 % Final    Estimated Avg Glucose 10/03/2020 91  68 - 131 mg/dL Final   Admission on 10/03/2020, Discharged on 10/03/2020   Component Date Value Ref Range Status    WBC 10/03/2020 7.41  3.90 - 12.70 K/uL Final    RBC 10/03/2020 4.76  4.60 - 6.20 M/uL Final    Hemoglobin 10/03/2020 15.0  14.0 - 18.0 g/dL Final    Hematocrit 10/03/2020 43.4  40.0 - 54.0 % Final    Mean Corpuscular Volume 10/03/2020 91  82 - 98 fL Final    Mean Corpuscular Hemoglobin 10/03/2020 31.5* 27.0 - 31.0 pg Final    Mean Corpuscular Hemoglobin Conc 10/03/2020 34.6  32.0 - 36.0 g/dL Final    RDW 10/03/2020 11.4* 11.5 - 14.5 % Final    Platelets 10/03/2020 234  150 - 350 K/uL Final    MPV 10/03/2020 9.3  9.2 - 12.9 fL Final    Immature Granulocytes 10/03/2020 0.3  0.0 - 0.5 %  Final    Gran # (ANC) 10/03/2020 5.4  1.8 - 7.7 K/uL Final    Immature Grans (Abs) 10/03/2020 0.02  0.00 - 0.04 K/uL Final    Lymph # 10/03/2020 1.6  1.0 - 4.8 K/uL Final    Mono # 10/03/2020 0.4  0.3 - 1.0 K/uL Final    Eos # 10/03/2020 0.0  0.0 - 0.5 K/uL Final    Baso # 10/03/2020 0.02  0.00 - 0.20 K/uL Final    nRBC 10/03/2020 0  0 /100 WBC Final    Gran% 10/03/2020 72.7  38.0 - 73.0 % Final    Lymph% 10/03/2020 20.9  18.0 - 48.0 % Final    Mono% 10/03/2020 5.4  4.0 - 15.0 % Final    Eosinophil% 10/03/2020 0.4  0.0 - 8.0 % Final    Basophil% 10/03/2020 0.3  0.0 - 1.9 % Final    Differential Method 10/03/2020 Automated   Final    Sodium 10/03/2020 144  136 - 145 mmol/L Final    Potassium 10/03/2020 3.6  3.5 - 5.1 mmol/L Final    Chloride 10/03/2020 107  95 - 110 mmol/L Final    CO2 10/03/2020 24  23 - 29 mmol/L Final    Glucose 10/03/2020 103  70 - 110 mg/dL Final    BUN, Bld 10/03/2020 12  6 - 20 mg/dL Final    Creatinine 10/03/2020 1.1  0.5 - 1.4 mg/dL Final    Calcium 10/03/2020 9.4  8.7 - 10.5 mg/dL Final    Total Protein 10/03/2020 7.8  6.0 - 8.4 g/dL Final    Albumin 10/03/2020 5.1  3.5 - 5.2 g/dL Final    Total Bilirubin 10/03/2020 0.8  0.1 - 1.0 mg/dL Final    Alkaline Phosphatase 10/03/2020 41* 55 - 135 U/L Final    AST 10/03/2020 14  10 - 40 U/L Final    ALT 10/03/2020 10  10 - 44 U/L Final    Anion Gap 10/03/2020 13  8 - 16 mmol/L Final    eGFR if African American 10/03/2020 >60  >60 mL/min/1.73 m^2 Final    eGFR if non African American 10/03/2020 >60  >60 mL/min/1.73 m^2 Final    TSH 10/03/2020 1.248  0.400 - 4.000 uIU/mL Final    Specimen UA 10/03/2020 Urine, Clean Catch   Final    Color, UA 10/03/2020 Yellow  Yellow, Straw, Veronique Final    Appearance, UA 10/03/2020 Clear  Clear Final    pH, UA 10/03/2020 6.0  5.0 - 8.0 Final    Specific [place], UA 10/03/2020 1.025  1.005 - 1.030 Final    Protein, UA 10/03/2020 Negative  Negative Final    Glucose, UA 10/03/2020  Negative  Negative Final    Ketones, UA 10/03/2020 2+* Negative Final    Bilirubin (UA) 10/03/2020 1+* Negative Final    Occult Blood UA 10/03/2020 Trace* Negative Final    Nitrite, UA 10/03/2020 Negative  Negative Final    Urobilinogen, UA 10/03/2020 1.0  <2.0 EU/dL Final    Leukocytes, UA 10/03/2020 Negative  Negative Final    Benzodiazepines 10/03/2020 Negative   Final    Methadone metabolites 10/03/2020 Negative   Final    Cocaine (Metab.) 10/03/2020 Negative   Final    Opiate Scrn, Ur 10/03/2020 Negative   Final    Barbiturate Screen, Ur 10/03/2020 Negative   Final    Amphetamine Screen, Ur 10/03/2020 Negative   Final    THC 10/03/2020 Negative   Final    Phencyclidine 10/03/2020 Negative   Final    Creatinine, Random Ur 10/03/2020 >450.0* 23.0 - 375.0 mg/dL Final    Toxicology Information 10/03/2020 SEE COMMENT   Final    Alcohol, Medical, Serum 10/03/2020 <10  <10 mg/dL Final    Acetaminophen (Tylenol), Serum 10/03/2020 <3.0* 10.0 - 20.0 ug/mL Final    SARS-CoV-2 RNA, Amplification, Qual 10/03/2020 Negative  Negative Final    Salicylate Lvl 10/03/2020 <5.0* 15.0 - 30.0 mg/dL Final         Discharged Condition: stable and improved; not currently a danger to self/others or gravely disabled    Disposition: Home or Self Care    Is patient being discharged on multiple neuroleptics? No    Follow Up/Patient Instructions:     Medications:  Reconciled Home Medications:      Medication List      START taking these medications    ARIPiprazole 2 MG Tab  Commonly known as: ABILIFY  Take 1 tablet (2 mg total) by mouth once daily.     nicotine 14 mg/24 hr  Commonly known as: NICODERM CQ  Place 1 patch onto the skin once daily.        CONTINUE taking these medications    EPINEPHrine 0.3 mg/0.3 mL Atin  Commonly known as: EPIPEN 2-ILANA  Inject 0.3 mLs (0.3 mg total) into the muscle once.     FLUoxetine 40 MG capsule  Take 1 capsule (40 mg total) by mouth once daily.        STOP taking these medications     cloNIDine HCL 0.1 mg Tb12     famotidine 20 MG tablet  Commonly known as: PEPCID          No discharge procedures on file.  Follow-up Information     [facility].    Specialties: Psychology, Psychiatry, Behavioral Health  Contact information:  [address] [facility] [address] [phone]                     Diet: regular     Activity as tolerated    Total time spent discharging patient: 32 minutes    James Tapia MD  Psychiatry

## 2020-10-07 NOTE — PLAN OF CARE
Behavior:  Patient attended and participated in psychotherapy this date. He was dressed in his own clothes and was wearing a mask.    Intervention:  Coping skills was discussed in psychotherapy group this date with an emphasis on encouraging patients to put knowledge strategies, and skills into practice. 2. Assist patients in identifying positive coping skills to better deal with stress and avoid abusing substances.  Response:  The patient stated that his most important coping skills are: Exercising, punching a punching bag, letting himself cry, taking a hot bath, baking some cookies. He would like to otto: dancing, art, and shopping online without buying anything. The nurse, Rina, came to get the patient to sign discharge paperwork during group, so group stopped while he was signing his paperwork.  Plan:  To continue to encourage patient to attend group psychotherapy and to learn about ways that he may put knowledge, strategies, and positive coping skills into practice.

## 2020-10-07 NOTE — PLAN OF CARE
Plan of care reviewed.  Denies intent to harm self or others at this time.  Accepts snacks and medications.  Gait steady, no falls.  Pleasantly interacting with staff and peers. States he is ready for discharge tomorrow. Promoted an individualized safety plan, reality-based interactions, effective coping strategies, and impulse control.  Will continue to monitor for safety.
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
## 2020-10-07 NOTE — PLAN OF CARE
Lying quiet in bed, eyes closed, respiration even and unlabored, appearing asleep since 2030.  Slept most all shift with 2 awakenings at 8962-2966 and then again briefly to go to bathroom at 0315.  States he was ok both times. Safety and precautions maintained with rounds every 15 minutes, bed is fixed in a low position and pathways kept clear.  No fall occurred.

## 2021-05-04 ENCOUNTER — PATIENT MESSAGE (OUTPATIENT)
Dept: RESEARCH | Facility: HOSPITAL | Age: 21
End: 2021-05-04

## 2023-02-16 ENCOUNTER — LAB VISIT (OUTPATIENT)
Dept: LAB | Facility: HOSPITAL | Age: 23
End: 2023-02-16
Attending: NURSE PRACTITIONER
Payer: MEDICAID

## 2023-02-16 DIAGNOSIS — Z00.01 ENCOUNTER FOR GENERAL ADULT MEDICAL EXAMINATION WITH ABNORMAL FINDINGS: Primary | ICD-10-CM

## 2023-02-16 LAB
ALBUMIN SERPL BCP-MCNC: 5 G/DL (ref 3.5–5.2)
ALP SERPL-CCNC: 46 U/L (ref 55–135)
ALT SERPL W/O P-5'-P-CCNC: 13 U/L (ref 10–44)
ANION GAP SERPL CALC-SCNC: 14 MMOL/L (ref 8–16)
AST SERPL-CCNC: 19 U/L (ref 10–40)
BASOPHILS # BLD AUTO: 0.02 K/UL (ref 0–0.2)
BASOPHILS NFR BLD: 0.3 % (ref 0–1.9)
BILIRUB SERPL-MCNC: 0.5 MG/DL (ref 0.1–1)
BILIRUB UR QL STRIP: NEGATIVE
BUN SERPL-MCNC: 22 MG/DL (ref 6–20)
CALCIUM SERPL-MCNC: 10.3 MG/DL (ref 8.7–10.5)
CHLORIDE SERPL-SCNC: 106 MMOL/L (ref 95–110)
CHOLEST SERPL-MCNC: 199 MG/DL (ref 120–199)
CHOLEST/HDLC SERPL: 4.3 {RATIO} (ref 2–5)
CLARITY UR: CLEAR
CO2 SERPL-SCNC: 24 MMOL/L (ref 23–29)
COLOR UR: YELLOW
CREAT SERPL-MCNC: 1.1 MG/DL (ref 0.5–1.4)
DIFFERENTIAL METHOD: ABNORMAL
EOSINOPHIL # BLD AUTO: 0.1 K/UL (ref 0–0.5)
EOSINOPHIL NFR BLD: 1.3 % (ref 0–8)
ERYTHROCYTE [DISTWIDTH] IN BLOOD BY AUTOMATED COUNT: 11.8 % (ref 11.5–14.5)
EST. GFR  (NO RACE VARIABLE): >60 ML/MIN/1.73 M^2
ESTIMATED AVG GLUCOSE: 85 MG/DL (ref 68–131)
GLUCOSE SERPL-MCNC: 91 MG/DL (ref 70–110)
GLUCOSE UR QL STRIP: NEGATIVE
HBA1C MFR BLD: 4.6 % (ref 4–5.6)
HCT VFR BLD AUTO: 45.7 % (ref 40–54)
HDLC SERPL-MCNC: 46 MG/DL (ref 40–75)
HDLC SERPL: 23.1 % (ref 20–50)
HGB BLD-MCNC: 15.8 G/DL (ref 14–18)
HGB UR QL STRIP: NEGATIVE
IMM GRANULOCYTES # BLD AUTO: 0.02 K/UL (ref 0–0.04)
IMM GRANULOCYTES NFR BLD AUTO: 0.3 % (ref 0–0.5)
KETONES UR QL STRIP: NEGATIVE
LDLC SERPL CALC-MCNC: 131.6 MG/DL (ref 63–159)
LEUKOCYTE ESTERASE UR QL STRIP: NEGATIVE
LYMPHOCYTES # BLD AUTO: 1.6 K/UL (ref 1–4.8)
LYMPHOCYTES NFR BLD: 24.5 % (ref 18–48)
MCH RBC QN AUTO: 31.9 PG (ref 27–31)
MCHC RBC AUTO-ENTMCNC: 34.6 G/DL (ref 32–36)
MCV RBC AUTO: 92 FL (ref 82–98)
MONOCYTES # BLD AUTO: 0.4 K/UL (ref 0.3–1)
MONOCYTES NFR BLD: 6.8 % (ref 4–15)
NEUTROPHILS # BLD AUTO: 4.2 K/UL (ref 1.8–7.7)
NEUTROPHILS NFR BLD: 66.8 % (ref 38–73)
NITRITE UR QL STRIP: NEGATIVE
NONHDLC SERPL-MCNC: 153 MG/DL
NRBC BLD-RTO: 0 /100 WBC
PH UR STRIP: 7 [PH] (ref 5–8)
PLATELET # BLD AUTO: 231 K/UL (ref 150–450)
PMV BLD AUTO: 8.8 FL (ref 9.2–12.9)
POTASSIUM SERPL-SCNC: 4.5 MMOL/L (ref 3.5–5.1)
PROT SERPL-MCNC: 8.5 G/DL (ref 6–8.4)
PROT UR QL STRIP: NEGATIVE
RBC # BLD AUTO: 4.96 M/UL (ref 4.6–6.2)
SODIUM SERPL-SCNC: 144 MMOL/L (ref 136–145)
SP GR UR STRIP: 1.02 (ref 1–1.03)
T4 FREE SERPL-MCNC: 1.05 NG/DL (ref 0.71–1.51)
TRIGL SERPL-MCNC: 107 MG/DL (ref 30–150)
TSH SERPL DL<=0.005 MIU/L-ACNC: 1.2 UIU/ML (ref 0.4–4)
URN SPEC COLLECT METH UR: NORMAL
UROBILINOGEN UR STRIP-ACNC: NEGATIVE EU/DL
WBC # BLD AUTO: 6.32 K/UL (ref 3.9–12.7)

## 2023-02-16 PROCEDURE — 84443 ASSAY THYROID STIM HORMONE: CPT | Performed by: NURSE PRACTITIONER

## 2023-02-16 PROCEDURE — 81003 URINALYSIS AUTO W/O SCOPE: CPT | Performed by: NURSE PRACTITIONER

## 2023-02-16 PROCEDURE — 85025 COMPLETE CBC W/AUTO DIFF WBC: CPT | Performed by: NURSE PRACTITIONER

## 2023-02-16 PROCEDURE — 84480 ASSAY TRIIODOTHYRONINE (T3): CPT | Performed by: NURSE PRACTITIONER

## 2023-02-16 PROCEDURE — 83036 HEMOGLOBIN GLYCOSYLATED A1C: CPT | Performed by: NURSE PRACTITIONER

## 2023-02-16 PROCEDURE — 80061 LIPID PANEL: CPT | Performed by: NURSE PRACTITIONER

## 2023-02-16 PROCEDURE — 36415 COLL VENOUS BLD VENIPUNCTURE: CPT | Performed by: NURSE PRACTITIONER

## 2023-02-16 PROCEDURE — 80053 COMPREHEN METABOLIC PANEL: CPT | Performed by: NURSE PRACTITIONER

## 2023-02-16 PROCEDURE — 82306 VITAMIN D 25 HYDROXY: CPT | Performed by: NURSE PRACTITIONER

## 2023-02-16 PROCEDURE — 84439 ASSAY OF FREE THYROXINE: CPT | Performed by: NURSE PRACTITIONER

## 2023-02-17 LAB
25(OH)D3+25(OH)D2 SERPL-MCNC: 22 NG/ML (ref 30–96)
T3 SERPL-MCNC: 103 NG/DL (ref 60–180)

## 2023-03-02 ENCOUNTER — HOSPITAL ENCOUNTER (EMERGENCY)
Facility: HOSPITAL | Age: 23
Discharge: HOME | End: 2023-03-02
Attending: SURGERY
Payer: MEDICAID

## 2023-03-02 VITALS
RESPIRATION RATE: 18 BRPM | DIASTOLIC BLOOD PRESSURE: 84 MMHG | TEMPERATURE: 97 F | OXYGEN SATURATION: 99 % | SYSTOLIC BLOOD PRESSURE: 145 MMHG | BODY MASS INDEX: 23.14 KG/M2 | HEART RATE: 97 BPM | WEIGHT: 165.88 LBS

## 2023-03-02 DIAGNOSIS — R10.9 FLANK PAIN: ICD-10-CM

## 2023-03-02 DIAGNOSIS — N20.0 KIDNEY STONE: Primary | ICD-10-CM

## 2023-03-02 LAB
ALBUMIN SERPL BCP-MCNC: 4.7 G/DL (ref 3.5–5.2)
ALP SERPL-CCNC: 48 U/L (ref 55–135)
ALT SERPL W/O P-5'-P-CCNC: 18 U/L (ref 10–44)
ANION GAP SERPL CALC-SCNC: 9 MMOL/L (ref 8–16)
AST SERPL-CCNC: 19 U/L (ref 10–40)
BASOPHILS # BLD AUTO: 0.03 K/UL (ref 0–0.2)
BASOPHILS NFR BLD: 0.5 % (ref 0–1.9)
BILIRUB SERPL-MCNC: 0.6 MG/DL (ref 0.1–1)
BILIRUB UR QL STRIP: NEGATIVE
BUN SERPL-MCNC: 19 MG/DL (ref 6–20)
CALCIUM SERPL-MCNC: 9.8 MG/DL (ref 8.7–10.5)
CHLORIDE SERPL-SCNC: 102 MMOL/L (ref 95–110)
CLARITY UR: CLEAR
CO2 SERPL-SCNC: 30 MMOL/L (ref 23–29)
COLOR UR: YELLOW
CREAT SERPL-MCNC: 1.2 MG/DL (ref 0.5–1.4)
DIFFERENTIAL METHOD: NORMAL
EOSINOPHIL # BLD AUTO: 0.2 K/UL (ref 0–0.5)
EOSINOPHIL NFR BLD: 2.8 % (ref 0–8)
ERYTHROCYTE [DISTWIDTH] IN BLOOD BY AUTOMATED COUNT: 11.5 % (ref 11.5–14.5)
EST. GFR  (NO RACE VARIABLE): >60 ML/MIN/1.73 M^2
GLUCOSE SERPL-MCNC: 89 MG/DL (ref 70–110)
GLUCOSE UR QL STRIP: NEGATIVE
HCT VFR BLD AUTO: 45.4 % (ref 40–54)
HGB BLD-MCNC: 15.2 G/DL (ref 14–18)
HGB UR QL STRIP: NEGATIVE
IMM GRANULOCYTES # BLD AUTO: 0.02 K/UL (ref 0–0.04)
IMM GRANULOCYTES NFR BLD AUTO: 0.4 % (ref 0–0.5)
KETONES UR QL STRIP: NEGATIVE
LEUKOCYTE ESTERASE UR QL STRIP: NEGATIVE
LYMPHOCYTES # BLD AUTO: 1.8 K/UL (ref 1–4.8)
LYMPHOCYTES NFR BLD: 32.6 % (ref 18–48)
MCH RBC QN AUTO: 30.9 PG (ref 27–31)
MCHC RBC AUTO-ENTMCNC: 33.5 G/DL (ref 32–36)
MCV RBC AUTO: 92 FL (ref 82–98)
MONOCYTES # BLD AUTO: 0.4 K/UL (ref 0.3–1)
MONOCYTES NFR BLD: 7.1 % (ref 4–15)
NEUTROPHILS # BLD AUTO: 3.2 K/UL (ref 1.8–7.7)
NEUTROPHILS NFR BLD: 56.6 % (ref 38–73)
NITRITE UR QL STRIP: NEGATIVE
NRBC BLD-RTO: 0 /100 WBC
PH UR STRIP: 7 [PH] (ref 5–8)
PLATELET # BLD AUTO: 240 K/UL (ref 150–450)
PMV BLD AUTO: 9.2 FL (ref 9.2–12.9)
POTASSIUM SERPL-SCNC: 4.2 MMOL/L (ref 3.5–5.1)
PROT SERPL-MCNC: 7.4 G/DL (ref 6–8.4)
PROT UR QL STRIP: NEGATIVE
RBC # BLD AUTO: 4.92 M/UL (ref 4.6–6.2)
SODIUM SERPL-SCNC: 141 MMOL/L (ref 136–145)
SP GR UR STRIP: 1.02 (ref 1–1.03)
URN SPEC COLLECT METH UR: NORMAL
UROBILINOGEN UR STRIP-ACNC: NEGATIVE EU/DL
WBC # BLD AUTO: 5.62 K/UL (ref 3.9–12.7)

## 2023-03-02 PROCEDURE — 63600175 PHARM REV CODE 636 W HCPCS

## 2023-03-02 PROCEDURE — 85025 COMPLETE CBC W/AUTO DIFF WBC: CPT

## 2023-03-02 PROCEDURE — 80053 COMPREHEN METABOLIC PANEL: CPT

## 2023-03-02 PROCEDURE — 36415 COLL VENOUS BLD VENIPUNCTURE: CPT

## 2023-03-02 PROCEDURE — 99285 EMERGENCY DEPT VISIT HI MDM: CPT | Mod: 25

## 2023-03-02 PROCEDURE — 81003 URINALYSIS AUTO W/O SCOPE: CPT

## 2023-03-02 PROCEDURE — 96372 THER/PROPH/DIAG INJ SC/IM: CPT

## 2023-03-02 RX ORDER — KETOROLAC TROMETHAMINE 30 MG/ML
60 INJECTION, SOLUTION INTRAMUSCULAR; INTRAVENOUS
Status: COMPLETED | OUTPATIENT
Start: 2023-03-02 | End: 2023-03-02

## 2023-03-02 RX ORDER — TAMSULOSIN HYDROCHLORIDE 0.4 MG/1
0.4 CAPSULE ORAL DAILY
Qty: 30 CAPSULE | Refills: 0 | Status: SHIPPED | OUTPATIENT
Start: 2023-03-02 | End: 2023-03-02 | Stop reason: SDUPTHER

## 2023-03-02 RX ORDER — TAMSULOSIN HYDROCHLORIDE 0.4 MG/1
0.4 CAPSULE ORAL DAILY
Qty: 30 CAPSULE | Refills: 0 | Status: SHIPPED | OUTPATIENT
Start: 2023-03-02 | End: 2023-04-01

## 2023-03-02 RX ORDER — KETOROLAC TROMETHAMINE 10 MG/1
10 TABLET, FILM COATED ORAL EVERY 6 HOURS
Qty: 20 TABLET | Refills: 0 | Status: SHIPPED | OUTPATIENT
Start: 2023-03-02 | End: 2023-03-02 | Stop reason: SDUPTHER

## 2023-03-02 RX ORDER — KETOROLAC TROMETHAMINE 10 MG/1
10 TABLET, FILM COATED ORAL EVERY 6 HOURS
Qty: 20 TABLET | Refills: 0 | Status: SHIPPED | OUTPATIENT
Start: 2023-03-02 | End: 2023-03-07

## 2023-03-02 RX ADMIN — KETOROLAC TROMETHAMINE 60 MG: 30 INJECTION, SOLUTION INTRAMUSCULAR at 02:03

## 2023-03-02 NOTE — ED PROVIDER NOTES
Encounter Date: 3/2/2023       History     Chief Complaint   Patient presents with    Flank Pain     Pt c/o right side flank pain s/t to kidney stone.     This note is dictated on M*Modal word recognition program.  There are word recognition mistakes and grammatical errors that are occasionally missed on review.     Fidel Nelson is a 23 y.o. male presents to ER today with complaints of right flank pain for several months now.  Patient reports several months ago he was diagnosed with a kidney stone to his right kidney.  She reports he was told he may pass the stone in a few days however he does not recall passing a stone after being diagnosed with the kidney stone.  Patient reports over the last 48 hours his right flank pain has flared up again he thinks he has a kidney stone or the same kidney stone.  Patient reports he is urinating without difficulty and his urine is yellow in color.    The history is provided by the patient.   Review of patient's allergies indicates:  No Known Allergies  Past Medical History:   Diagnosis Date    ADHD (attention deficit hyperactivity disorder)     Allergic reaction     Anxiety     Asperger's disorder     Eczema     Hx of psychiatric care     OCD (obsessive compulsive disorder)     Psychiatric problem     Scoliosis     pt says mild    Suicide attempt      Past Surgical History:   Procedure Laterality Date    HERNIA REPAIR       Family History   Problem Relation Age of Onset    Depression Mother     Anxiety disorder Mother     Alcohol abuse Father     Drug abuse Father     Bipolar disorder Maternal Grandfather     Bipolar disorder Paternal Grandmother      Social History     Tobacco Use    Smoking status: Some Days     Packs/day: 1.00     Years: 1.00     Pack years: 1.00     Types: Cigarettes    Smokeless tobacco: Never   Substance Use Topics    Alcohol use: No    Drug use: Yes     Types: Marijuana     Comment: uses once every 2 months     Review of Systems   Constitutional:  Negative.    HENT: Negative.     Eyes: Negative.    Cardiovascular: Negative.    Gastrointestinal: Negative.    Genitourinary:  Positive for flank pain (patient reports right flank pain.).   Allergic/Immunologic: Negative.    Neurological: Negative.    Hematological: Negative.      Physical Exam     Initial Vitals [03/02/23 1443]   BP Pulse Resp Temp SpO2   (!) 145/84 97 18 97.1 °F (36.2 °C) 99 %      MAP       --         Physical Exam    Constitutional: He appears well-developed and well-nourished. He is not diaphoretic. No distress.   HENT:   Right Ear: External ear normal.   Left Ear: External ear normal.   Eyes: Pupils are equal, round, and reactive to light. Right eye exhibits no discharge. Left eye exhibits no discharge. No scleral icterus.   Neck: Neck supple.   Normal range of motion.  Cardiovascular:  Normal rate.     Exam reveals no gallop and no friction rub.       Pulmonary/Chest: Breath sounds normal. No respiratory distress. He has no wheezes.   Abdominal: Abdomen is soft.   Genitourinary:    Genitourinary Comments: Patient does have some mild CVA tenderness to right flank on exam today.     Musculoskeletal:      Cervical back: Normal range of motion and neck supple.     Skin: Skin is warm. Capillary refill takes less than 2 seconds. No rash noted. No erythema. No pallor.   Psychiatric: He has a normal mood and affect. His behavior is normal. Thought content normal.       ED Course   Procedures  Labs Reviewed   COMPREHENSIVE METABOLIC PANEL - Abnormal; Notable for the following components:       Result Value    CO2 30 (*)     Alkaline Phosphatase 48 (*)     All other components within normal limits   CBC W/ AUTO DIFFERENTIAL   URINALYSIS, REFLEX TO URINE CULTURE    Narrative:     Specimen Source->Urine          Imaging Results              CT Renal Stone Study ABD Pelvis WO (Final result)  Result time 03/02/23 15:42:59      Final result by Anu Sargent MD (03/02/23 15:42:59)                    Impression:      There are bilateral nonobstructing renal calculi the largest of which measures 7 mm.      Electronically signed by: Anu Sargent MD  Date:    03/02/2023  Time:    15:42               Narrative:    EXAMINATION:  CT RENAL STONE STUDY ABD PELVIS WO    CLINICAL HISTORY:  Flank pain, kidney stone suspected;    TECHNIQUE:  Low dose axial images, sagittal and coronal reformations were obtained from the lung bases to the pubic symphysis.  Contrast was not administered.    COMPARISON:  None    FINDINGS:  The liver, spleen, adrenal glands and pancreas are unremarkable.    There are bilateral renal calculi the largest of which measures 7.5 mm.  There is no evidence hydronephrosis.  There is no evidence intra-abdominal fluid collection.  Stool seen throughout the colon.    The appendix is within normal limits.    There is no evidence intra-abdominal free fluid or free air.  There is no evidence of abdominal nor pelvic lymphadenopathy.    The osseous structures are unremarkable.                                       Medications   ketorolac injection 60 mg (60 mg Intramuscular Given 3/2/23 1446)     Medical Decision Making:   Differential Diagnosis:   Renal stone, pyelonephritis, urinary tract infection  Clinical Tests:   Lab Tests: Ordered and Reviewed  Radiological Study: Ordered and Reviewed  ED Management:  Impression:  CT renal stone study 3-2-23  There are bilateral nonobstructing renal calculi the largest of which measures 7 mm.  Patient placed on Flomax and Toradol to control pain  Patient instructed to follow-up with urology  I provided patient with contact information to two local urologist for him to call and make a follow-up appointment.   Patient stable at time of discharge in no acute distress.  No life-threatening illnesses were found during ER visit today.  Patient was instructed to follow-up with PCP or other recommended specialist within the next 48-72 hours.  Patient was instructed to  return to ER immediately for any worsening or concerning symptoms.  All discharge instructions discussed with patient, and patient agrees to comply with discharge instructions given today.                             Clinical Impression:   Final diagnoses:  [N20.0] Kidney stone (Primary)  [R10.9] Flank pain        ED Disposition Condition    Discharge Stable          ED Prescriptions       Medication Sig Dispense Start Date End Date Auth. Provider    tamsulosin (FLOMAX) 0.4 mg Cap  (Status: Discontinued) Take 1 capsule (0.4 mg total) by mouth once daily. 30 capsule 3/2/2023 3/2/2023 Angel Delgado NP    ketorolac (TORADOL) 10 mg tablet  (Status: Discontinued) Take 1 tablet (10 mg total) by mouth every 6 (six) hours. for 5 days 20 tablet 3/2/2023 3/2/2023 Angel Delgado NP    ketorolac (TORADOL) 10 mg tablet Take 1 tablet (10 mg total) by mouth every 6 (six) hours. for 5 days 20 tablet 3/2/2023 3/7/2023 Angel Delgado NP    tamsulosin (FLOMAX) 0.4 mg Cap Take 1 capsule (0.4 mg total) by mouth once daily. 30 capsule 3/2/2023 4/1/2023 Angel Delgado NP          Follow-up Information       Follow up With Specialties Details Why Contact Info Additional Information    [facility]  Schedule an appointment as soon as possible for a visit in 1 day  [address] [place] [id number]  [phone]       [facility] Schedule an appointment as soon as possible for a visit in 1 week  [address] [facility] [id number] [phone] Please park and enter the facility through the front entrance of the hospital. Please enter through the Clinic entrance located on the right side of the hospital. Please check-in at the Information Desk.                    Angel Delgado NP  03/02/23 5092

## 2023-03-14 ENCOUNTER — HOSPITAL ENCOUNTER (OUTPATIENT)
Dept: RADIOLOGY | Facility: HOSPITAL | Age: 23
Discharge: HOME | End: 2023-03-14
Attending: SPECIALIST
Payer: MEDICAID

## 2023-03-14 DIAGNOSIS — N20.0 RENAL STONE: ICD-10-CM

## 2023-03-14 DIAGNOSIS — N20.0 RENAL STONE: Primary | ICD-10-CM

## 2023-03-14 PROCEDURE — 74018 RADEX ABDOMEN 1 VIEW: CPT | Mod: TC

## 2024-06-06 ENCOUNTER — HOSPITAL ENCOUNTER (EMERGENCY)
Facility: HOSPITAL | Age: 24
Discharge: HOME | End: 2024-06-07
Attending: EMERGENCY MEDICINE
Payer: MEDICAID

## 2024-06-06 VITALS
HEIGHT: 69 IN | WEIGHT: 170 LBS | DIASTOLIC BLOOD PRESSURE: 83 MMHG | SYSTOLIC BLOOD PRESSURE: 137 MMHG | TEMPERATURE: 99 F | HEART RATE: 107 BPM | BODY MASS INDEX: 25.18 KG/M2 | RESPIRATION RATE: 19 BRPM | OXYGEN SATURATION: 98 %

## 2024-06-06 DIAGNOSIS — J02.0 STREP PHARYNGITIS: Primary | ICD-10-CM

## 2024-06-06 LAB — STREP A PCR (OHS): DETECTED

## 2024-06-06 PROCEDURE — 99284 EMERGENCY DEPT VISIT MOD MDM: CPT

## 2024-06-06 PROCEDURE — 87651 STREP A DNA AMP PROBE: CPT | Performed by: EMERGENCY MEDICINE

## 2024-06-06 RX ORDER — AMOXICILLIN AND CLAVULANATE POTASSIUM 875; 125 MG/1; MG/1
1 TABLET, FILM COATED ORAL 2 TIMES DAILY
Qty: 20 TABLET | Refills: 0 | Status: SHIPPED | OUTPATIENT
Start: 2024-06-06 | End: 2024-06-16

## 2024-06-06 NOTE — Clinical Note
"Demetrio Cole" Leslie was seen and treated in our emergency department on 6/6/2024.  He may return to work on 06/11/2024.       If you have any questions or concerns, please don't hesitate to call.       RN    "

## 2024-06-07 PROCEDURE — 25000003 PHARM REV CODE 250: Performed by: NURSE PRACTITIONER

## 2024-06-07 RX ADMIN — DIPHENHYDRAMINE HYDROCHLORIDE 15 ML: 25 SOLUTION ORAL at 12:06

## 2024-06-07 NOTE — ED PROVIDER NOTES
Encounter Date: 6/6/2024       History     Chief Complaint   Patient presents with    Sore Throat     Sore throat x 3 days with some coughing. Denies fever.      See MDM    The history is provided by the patient. No  was used.     Review of patient's allergies indicates:  No Known Allergies  Past Medical History:   Diagnosis Date    ADHD (attention deficit hyperactivity disorder)     Allergic reaction     Anxiety     Asperger's disorder     Eczema     Hx of psychiatric care     OCD (obsessive compulsive disorder)     Psychiatric problem     Scoliosis     pt says mild    Suicide attempt      Past Surgical History:   Procedure Laterality Date    HERNIA REPAIR       Family History   Problem Relation Name Age of Onset    Depression Mother      Anxiety disorder Mother      Alcohol abuse Father      Drug abuse Father      Bipolar disorder Maternal Grandfather      Bipolar disorder Paternal Grandmother       Social History     Tobacco Use    Smoking status: Some Days     Current packs/day: 1.00     Average packs/day: 1 pack/day for 1 year (1.0 ttl pk-yrs)     Types: Cigarettes    Smokeless tobacco: Never   Substance Use Topics    Alcohol use: No    Drug use: Yes     Types: Marijuana     Comment: uses once every 2 months     Review of Systems   Constitutional:  Negative for fever.   HENT:  Positive for sore throat.    Respiratory:  Negative for cough and shortness of breath.    Cardiovascular:  Negative for chest pain.   Gastrointestinal:  Negative for abdominal pain.   Genitourinary:  Negative for difficulty urinating and dysuria.   Musculoskeletal:  Negative for gait problem.   Skin:  Negative for color change.   Neurological:  Negative for dizziness, speech difficulty and headaches.   Psychiatric/Behavioral:  Negative for hallucinations and suicidal ideas.    All other systems reviewed and are negative.      Physical Exam     Initial Vitals [06/06/24 2120]   BP Pulse Resp Temp SpO2   137/83 107 19  99.3 °F (37.4 °C) 98 %      MAP       --         Physical Exam    Nursing note and vitals reviewed.  Constitutional: He appears well-developed and well-nourished.   HENT:   Head: Normocephalic.   Pharyngeal erythema.  No asymmetry.  No uvula shift   Eyes: EOM are normal.   Neck: Neck supple.   Normal range of motion.  Cardiovascular:  Normal rate, regular rhythm, normal heart sounds and intact distal pulses.           Pulmonary/Chest: Breath sounds normal.   Abdominal: Abdomen is soft. Bowel sounds are normal.   Musculoskeletal:         General: Normal range of motion.      Cervical back: Normal range of motion and neck supple.     Neurological: He is alert and oriented to person, place, and time. He has normal strength.   Skin: Skin is warm and dry. Capillary refill takes less than 2 seconds.   Psychiatric: He has a normal mood and affect. His behavior is normal. Judgment and thought content normal.         ED Course   Procedures  Labs Reviewed   STREP GROUP A BY PCR - Abnormal; Notable for the following components:       Result Value    STREP A PCR ([facility]) Detected (*)     All other components within normal limits    Narrative:     The Xpert Xpress Strep A test is a rapid, qualitative in vitro diagnostic test for the detection of Streptococcus pyogenes (Group A ß-hemolytic Streptococcus, Strep A) in throat swab specimens from patients with signs and symptoms of pharyngitis.            Imaging Results    None          Medications   (Magic mouthwash) 1:1:1 diphenhydrAMINE(Benadryl) 12.5mg/5ml liq, aluminum & magnesium hydroxide-simethicone (Maalox), LIDOcaine viscous 2% (has no administration in time range)     Medical Decision Making  Historian:  Patient.  Patient is a 24-year-old male  that presents with sore throat that has been present few days. Associated symptoms nothing. Surrounding information is nothing. Exacerbated by nothing. Relieved by nothing. Patient treatment prior to arrival none. Risk factors include  none. Other history pertaining to this complaint nothing.   Assessment:  See physical exam.  DD:  Strep throat, pharyngitis, tonsillitis  ED Course: History was obtained.  Physical was performed.  Patient has a positive strep test.  Will put him on magic mouthwash and Augmentin. Medical or surgical consults:  None. Social determinants that affect healthcare:  None.       Amount and/or Complexity of Data Reviewed  Labs:      Details: Strep positive    Risk  Prescription drug management.  Risk Details: Augmentin and magic mouthwash                                      Clinical Impression:  Final diagnoses:  [J02.0] Strep pharyngitis (Primary)          ED Disposition Condition    Discharge Stable          ED Prescriptions       Medication Sig Dispense Start Date End Date Auth. Provider    diphenhydrAMINE-aluminum-magnesium hydroxide-simethicone-LIDOcaine viscous HCl 2% Swish and spit 15 mLs every 4 (four) hours as needed (sore throat). 160 each 6/6/2024 -- Boaz Manzo FNP    amoxicillin-clavulanate 875-125mg (AUGMENTIN) 875-125 mg per tablet Take 1 tablet by mouth 2 (two) times daily. for 10 days 20 tablet 6/6/2024 6/16/2024 Boaz Manzo FNP          Follow-up Information       Follow up With Specialties Details Why Contact Info    Your Primary Care Provider  Call in 3 days ed follow up              Boaz Manzo FNP  06/06/24 1080

## 2025-07-03 ENCOUNTER — ANESTHESIA EVENT (OUTPATIENT)
Dept: SURGERY | Facility: HOSPITAL | Age: 25
End: 2025-07-03
Payer: MEDICAID

## 2025-07-03 NOTE — ANESTHESIA PREPROCEDURE EVALUATION
07/03/2025  Demetrio Nelson is a 25 y.o., male.      Pre-op Assessment    I have reviewed the Patient Summary Reports.     I have reviewed the Nursing Notes. I have reviewed the NPO Status.   I have reviewed the Medications.     Review of Systems  Anesthesia Hx:  No problems with previous Anesthesia   Neg history of prior surgery.          Denies Family Hx of Anesthesia complications.    Denies Personal Hx of Anesthesia complications.                    Social:  Smoker, Recreational Drugs, Alcohol Use       Hematology/Oncology:  Hematology Normal   Oncology Normal                                   EENT/Dental:  EENT/Dental Normal           Cardiovascular:  Cardiovascular Normal                                              Pulmonary:  Pulmonary Normal                       Renal/:  Chronic Renal Disease renal calculi               Hepatic/GI:  Hepatic/GI Normal                    Musculoskeletal:  Musculoskeletal Normal                Neurological:  Neurology Normal                                      Endocrine:  Endocrine Normal            Dermatological:  Skin Normal    Psych:  Psychiatric History anxiety depression Asperger syndrome   ADHD                           Physical Exam  General: Cooperative and Alert    Airway:  Mallampati: I   Mouth Opening: Normal  TM Distance: Normal  Tongue: Normal  Neck ROM: Normal ROM        Anesthesia Plan  Type of Anesthesia, risks & benefits discussed:    Anesthesia Type: Gen Supraglottic Airway  Intra-op Monitoring Plan: Standard ASA Monitors  Post Op Pain Control Plan: multimodal analgesia  Induction:  IV  Airway Plan: Direct  Informed Consent: Informed consent signed with the Patient representative and all parties understand the risks and agree with anesthesia plan.  All questions answered. Patient consented to blood products? Yes  ASA Score: 2  Day of Surgery Review  of History & Physical: I have interviewed and examined the patient. I have reviewed the patient's H&P dated: There are no significant changes.     Ready For Surgery From Anesthesia Perspective.     .

## 2025-07-07 ENCOUNTER — HOSPITAL ENCOUNTER (OUTPATIENT)
Facility: HOSPITAL | Age: 25
Discharge: HOME | End: 2025-07-07
Attending: STUDENT IN AN ORGANIZED HEALTH CARE EDUCATION/TRAINING PROGRAM | Admitting: STUDENT IN AN ORGANIZED HEALTH CARE EDUCATION/TRAINING PROGRAM
Payer: MEDICAID

## 2025-07-07 ENCOUNTER — ANESTHESIA (OUTPATIENT)
Dept: SURGERY | Facility: HOSPITAL | Age: 25
End: 2025-07-07
Payer: MEDICAID

## 2025-07-07 VITALS
HEIGHT: 69 IN | HEART RATE: 66 BPM | WEIGHT: 170 LBS | DIASTOLIC BLOOD PRESSURE: 81 MMHG | TEMPERATURE: 98 F | RESPIRATION RATE: 18 BRPM | SYSTOLIC BLOOD PRESSURE: 127 MMHG | BODY MASS INDEX: 25.18 KG/M2 | OXYGEN SATURATION: 98 %

## 2025-07-07 DIAGNOSIS — N20.0 KIDNEY STONE: Primary | ICD-10-CM

## 2025-07-07 PROCEDURE — 63600175 PHARM REV CODE 636 W HCPCS: Performed by: ANESTHESIOLOGY

## 2025-07-07 PROCEDURE — 37000009 HC ANESTHESIA EA ADD 15 MINS: Performed by: STUDENT IN AN ORGANIZED HEALTH CARE EDUCATION/TRAINING PROGRAM

## 2025-07-07 PROCEDURE — 71000015 HC POSTOP RECOV 1ST HR: Performed by: STUDENT IN AN ORGANIZED HEALTH CARE EDUCATION/TRAINING PROGRAM

## 2025-07-07 PROCEDURE — C1894 INTRO/SHEATH, NON-LASER: HCPCS | Performed by: STUDENT IN AN ORGANIZED HEALTH CARE EDUCATION/TRAINING PROGRAM

## 2025-07-07 PROCEDURE — 63600175 PHARM REV CODE 636 W HCPCS: Performed by: NURSE ANESTHETIST, CERTIFIED REGISTERED

## 2025-07-07 PROCEDURE — 71000033 HC RECOVERY, INTIAL HOUR: Performed by: STUDENT IN AN ORGANIZED HEALTH CARE EDUCATION/TRAINING PROGRAM

## 2025-07-07 PROCEDURE — 25000003 PHARM REV CODE 250: Performed by: ANESTHESIOLOGY

## 2025-07-07 PROCEDURE — C1758 CATHETER, URETERAL: HCPCS | Performed by: STUDENT IN AN ORGANIZED HEALTH CARE EDUCATION/TRAINING PROGRAM

## 2025-07-07 PROCEDURE — 36000706: Performed by: STUDENT IN AN ORGANIZED HEALTH CARE EDUCATION/TRAINING PROGRAM

## 2025-07-07 PROCEDURE — C1773 RET DEV, INSERTABLE: HCPCS | Performed by: STUDENT IN AN ORGANIZED HEALTH CARE EDUCATION/TRAINING PROGRAM

## 2025-07-07 PROCEDURE — 71000016 HC POSTOP RECOV ADDL HR: Performed by: STUDENT IN AN ORGANIZED HEALTH CARE EDUCATION/TRAINING PROGRAM

## 2025-07-07 PROCEDURE — C2617 STENT, NON-COR, TEM W/O DEL: HCPCS | Performed by: STUDENT IN AN ORGANIZED HEALTH CARE EDUCATION/TRAINING PROGRAM

## 2025-07-07 PROCEDURE — 36000707: Performed by: STUDENT IN AN ORGANIZED HEALTH CARE EDUCATION/TRAINING PROGRAM

## 2025-07-07 PROCEDURE — 25000003 PHARM REV CODE 250: Performed by: NURSE ANESTHETIST, CERTIFIED REGISTERED

## 2025-07-07 PROCEDURE — 37000008 HC ANESTHESIA 1ST 15 MINUTES: Performed by: STUDENT IN AN ORGANIZED HEALTH CARE EDUCATION/TRAINING PROGRAM

## 2025-07-07 PROCEDURE — 63600175 PHARM REV CODE 636 W HCPCS: Performed by: STUDENT IN AN ORGANIZED HEALTH CARE EDUCATION/TRAINING PROGRAM

## 2025-07-07 PROCEDURE — C1747 OPTIME ENDOSCOPE, SINGLE, URINARY TRACT: HCPCS | Performed by: STUDENT IN AN ORGANIZED HEALTH CARE EDUCATION/TRAINING PROGRAM

## 2025-07-07 PROCEDURE — C1769 GUIDE WIRE: HCPCS | Performed by: STUDENT IN AN ORGANIZED HEALTH CARE EDUCATION/TRAINING PROGRAM

## 2025-07-07 DEVICE — URETERAL STENT
Type: IMPLANTABLE DEVICE | Site: URETER | Status: FUNCTIONAL
Brand: PERCUFLEX™ PLUS

## 2025-07-07 RX ORDER — DIAZEPAM 5 MG/1
10 TABLET ORAL ONCE
Status: COMPLETED | OUTPATIENT
Start: 2025-07-07 | End: 2025-07-07

## 2025-07-07 RX ORDER — LIDOCAINE HYDROCHLORIDE 20 MG/ML
INJECTION, SOLUTION EPIDURAL; INFILTRATION; INTRACAUDAL; PERINEURAL
Status: DISCONTINUED | OUTPATIENT
Start: 2025-07-07 | End: 2025-07-07

## 2025-07-07 RX ORDER — GLUCAGON 1 MG
1 KIT INJECTION
Status: DISCONTINUED | OUTPATIENT
Start: 2025-07-07 | End: 2025-07-07

## 2025-07-07 RX ORDER — SODIUM CHLORIDE, SODIUM LACTATE, POTASSIUM CHLORIDE, CALCIUM CHLORIDE 600; 310; 30; 20 MG/100ML; MG/100ML; MG/100ML; MG/100ML
INJECTION, SOLUTION INTRAVENOUS CONTINUOUS
Status: DISCONTINUED | OUTPATIENT
Start: 2025-07-07 | End: 2025-07-07 | Stop reason: HOSPADM

## 2025-07-07 RX ORDER — HYDROMORPHONE HYDROCHLORIDE 2 MG/ML
0.2 INJECTION, SOLUTION INTRAMUSCULAR; INTRAVENOUS; SUBCUTANEOUS EVERY 5 MIN PRN
Status: DISCONTINUED | OUTPATIENT
Start: 2025-07-07 | End: 2025-07-07

## 2025-07-07 RX ORDER — CEFAZOLIN SODIUM 2 G/50ML
2 SOLUTION INTRAVENOUS
Status: COMPLETED | OUTPATIENT
Start: 2025-07-07 | End: 2025-07-07

## 2025-07-07 RX ORDER — DEXAMETHASONE SODIUM PHOSPHATE 4 MG/ML
INJECTION, SOLUTION INTRA-ARTICULAR; INTRALESIONAL; INTRAMUSCULAR; INTRAVENOUS; SOFT TISSUE
Status: DISCONTINUED | OUTPATIENT
Start: 2025-07-07 | End: 2025-07-07

## 2025-07-07 RX ORDER — HYDROMORPHONE HYDROCHLORIDE 2 MG/ML
INJECTION, SOLUTION INTRAMUSCULAR; INTRAVENOUS; SUBCUTANEOUS
Status: DISCONTINUED | OUTPATIENT
Start: 2025-07-07 | End: 2025-07-07

## 2025-07-07 RX ORDER — PROPOFOL 10 MG/ML
INJECTION, EMULSION INTRAVENOUS
Status: DISCONTINUED | OUTPATIENT
Start: 2025-07-07 | End: 2025-07-07

## 2025-07-07 RX ORDER — FENTANYL CITRATE 50 UG/ML
INJECTION, SOLUTION INTRAMUSCULAR; INTRAVENOUS
Status: DISCONTINUED | OUTPATIENT
Start: 2025-07-07 | End: 2025-07-07

## 2025-07-07 RX ORDER — DEXMEDETOMIDINE HYDROCHLORIDE 100 UG/ML
INJECTION, SOLUTION INTRAVENOUS
Status: DISCONTINUED | OUTPATIENT
Start: 2025-07-07 | End: 2025-07-07

## 2025-07-07 RX ORDER — ONDANSETRON HYDROCHLORIDE 2 MG/ML
INJECTION, SOLUTION INTRAVENOUS
Status: DISCONTINUED | OUTPATIENT
Start: 2025-07-07 | End: 2025-07-07

## 2025-07-07 RX ORDER — MIDAZOLAM HYDROCHLORIDE 1 MG/ML
INJECTION INTRAMUSCULAR; INTRAVENOUS
Status: DISCONTINUED | OUTPATIENT
Start: 2025-07-07 | End: 2025-07-07

## 2025-07-07 RX ORDER — DIAZEPAM 5 MG/1
10 TABLET ORAL
Status: CANCELLED | OUTPATIENT
Start: 2025-07-07 | End: 2025-07-07

## 2025-07-07 RX ADMIN — FENTANYL CITRATE 50 MCG: 50 INJECTION, SOLUTION INTRAMUSCULAR; INTRAVENOUS at 09:07

## 2025-07-07 RX ADMIN — CEFAZOLIN SODIUM 2 G: 2 SOLUTION INTRAVENOUS at 09:07

## 2025-07-07 RX ADMIN — DEXMEDETOMIDINE 10 MCG: 200 INJECTION, SOLUTION INTRAVENOUS at 10:07

## 2025-07-07 RX ADMIN — DIAZEPAM 10 MG: 5 TABLET ORAL at 07:07

## 2025-07-07 RX ADMIN — PROPOFOL 200 MG: 10 INJECTION, EMULSION INTRAVENOUS at 09:07

## 2025-07-07 RX ADMIN — SODIUM CHLORIDE, POTASSIUM CHLORIDE, SODIUM LACTATE AND CALCIUM CHLORIDE: 600; 310; 30; 20 INJECTION, SOLUTION INTRAVENOUS at 07:07

## 2025-07-07 RX ADMIN — HYDROMORPHONE HYDROCHLORIDE 0.5 MG: 2 INJECTION INTRAMUSCULAR; INTRAVENOUS; SUBCUTANEOUS at 10:07

## 2025-07-07 RX ADMIN — DEXAMETHASONE SODIUM PHOSPHATE 4 MG: 4 INJECTION, SOLUTION INTRA-ARTICULAR; INTRALESIONAL; INTRAMUSCULAR; INTRAVENOUS; SOFT TISSUE at 09:07

## 2025-07-07 RX ADMIN — LIDOCAINE HYDROCHLORIDE 60 MG: 20 INJECTION, SOLUTION EPIDURAL; INFILTRATION; INTRACAUDAL; PERINEURAL at 09:07

## 2025-07-07 RX ADMIN — MIDAZOLAM 2 MG: 1 INJECTION INTRAMUSCULAR; INTRAVENOUS at 09:07

## 2025-07-07 RX ADMIN — ONDANSETRON 4 MG: 2 INJECTION INTRAMUSCULAR; INTRAVENOUS at 09:07

## 2025-07-07 NOTE — ANESTHESIA POSTPROCEDURE EVALUATION
Anesthesia Post Evaluation    Patient: Demetrio Nelson    Procedure(s) Performed: Procedure(s) (LRB):  CYSTOURETHROSCOPY, WITH URETERAL CALCULUS REMOVAL (Right)  CYSTOSCOPY, WITH RETROGRADE PYELOGRAM AND URETERAL STENT INSERTION (Right)    Final Anesthesia Type: general      Patient location during evaluation: PACU  Patient participation: Yes- Able to Participate  Level of consciousness: awake and alert and oriented  Post-procedure vital signs: reviewed and stable  Pain management: adequate  Airway patency: patent    PONV status at discharge: No PONV  Anesthetic complications: no      Cardiovascular status: stable  Respiratory status: unassisted, spontaneous ventilation and room air  Hydration status: euvolemic  Follow-up not needed.              Vitals Value Taken Time   /84 07/07/25 11:16   Temp 36.8 °C (98.2 °F) 07/07/25 11:01   Pulse 73 07/07/25 11:16   Resp 18 07/07/25 11:01   SpO2 98 % 07/07/25 11:16         Event Time   Out of Recovery 10:56:39         Pain/Christian Score: Christian Score: 10 (7/7/2025 10:54 AM)

## 2025-07-07 NOTE — DISCHARGE INSTRUCTIONS
MAY HAVE SOME BLEEDING AND BURNING WHEN YOU URINATE  DRINK A LOT OF WATER TODAY TO CLEAR. NOTIFY DR IF YOU HAVE   TROUBLE URINATING OR EXCESSIVE BLEEDING.

## 2025-07-07 NOTE — ANESTHESIA PROCEDURE NOTES
Igel Insertion    Date/Time: 7/7/2025 9:44 AM    Performed by: Adalid Santana CRNA  Authorized by: Yasmany Rodriguez DO    Intubation:     Induction:  Intravenous    Mask Ventilation:  N/a    Attempts:  1    Attempted By:  CRNA    Difficult Airway Encountered?: No      Airway Device:  Supraglottic airway/LMA    Airway Device Size:  4.0    Style/Cuff Inflation:  Uncuffed    Placement Verified By:  Capnometry    Complicating Factors:  None    Findings Post-Intubation:  BS equal bilateral and atraumatic/condition of teeth unchanged

## 2025-07-07 NOTE — TRANSFER OF CARE
"Anesthesia Transfer of Care Note    Patient: Demetrio Nelson    Procedure(s) Performed: Procedure(s) (LRB):  CYSTOURETHROSCOPY, WITH URETERAL CALCULUS REMOVAL (Right)  CYSTOSCOPY, WITH RETROGRADE PYELOGRAM AND URETERAL STENT INSERTION (Right)    Patient location: PACU    Anesthesia Type: general    Transport from OR: Transported from OR on room air with adequate spontaneous ventilation    Post pain: adequate analgesia    Post assessment: no apparent anesthetic complications    Post vital signs: stable    Level of consciousness: responds to stimulation    Nausea/Vomiting: no nausea/vomiting    Complications: none    Transfer of care protocol was followed      Last vitals: Visit Vitals  /83   Pulse 85   Temp 36.8 °C (98.2 °F) (Oral)   Resp 20   Ht 5' 9" (1.753 m)   Wt 77.1 kg (170 lb)   SpO2 99%   BMI 25.10 kg/m²     "

## 2025-07-07 NOTE — OP NOTE
UROLOGY OPERATIVE NOTE    Demetrio Nelson  2000  [id number]  7/7/2025      Pre-op Diagnosis:   Right ureteral stone  Right renal stone    Post-op Diagnosis: same as above    Procedure:   Cystoscopy with right retrograde pyelogram  Right ureteroscopy with stone basketing  Right ureteral stent placement  Fluoroscopy with physician interpretation, less than 1 hour    Surgeon: Diamond Graham     Assistant: none    Anesthesia: general LMA    Complications: none    IVF: crystalloid    Blood Loss: minimal    Indications: obstructing right ureteral stone    Implants: 6x26 JJ ureteral stent (on a string)    Procedure Details:   Patient was taken back to the operating room where he was transferred from the [facility] to the operating room table and placed in the supine position.  General anesthesia was induced via LMA and preoperative antibiotics (Ancef) were administered.  Following induction of anesthesia and administration of antibiotics, the patient was placed in the dorsal lithotomy position where he was prepped and draped in the standard sterile fashion.  A final time-out was then performed identifying the patient by name, date of birth, procedure being performed, laterality of the procedure, and all in the room were in agreement.    I began the case by 1st inserting a 22 [place] rigid cystoscope into the patient's bladder by way of the urethra.  On entry into the bladder, it was emptied and then refilled before performing pan cystoscopy.  Bilateral ureteral orifices were noted to be in orthotopic position and there were no stones or lesions concerning for malignancy present.  At this point I cannulated the right ureteral orifice with a 5 [place] open-ended ureteral catheter and was able to advance a sensor wire up to the level of the renal pelvis under direct fluoroscopic examination.  I then utilized a rigid ureteroscope to cannulate the right ureteral orifice.  The ureteroscope was advanced just a few cm proximal to the  ureteral orifice, where I encountered a narrowing of the ureter with an obstructing stone noted just proximal to it.  I then placed a Glidewire up to the level of the renal pelvis, through my scope, and was able to advance the scope past this narrowed area.  Once past this area, was able to utilize a ZeroTip basket to carefully grasp the stone, of which was removed from the ureter and placed into the bladder.  At this point I then re-examined the ureter and, despite the area of narrowing and mild ureteral abrasions, there was no significant injury or residual stone noted.    At this point I elected to leave a 6 x 26 double-J ureteral stent, on a string, placed under direct fluoroscopic examination with a proximal curl in the renal pelvis and a distal curl in the bladder.  The patient's bladder was emptied, removing the stone and sending it off for analysis, and this concluded the case.  The stent was attached to the penile shaft using Mastisol and Steri-Strips.    The patient was then gently woken from anesthesia, transferred from the operating room table back to the [facility], and sent to PACU in stable condition.  He will remove the stent on a string on Thursday, 07/10/2025, and will then follow up with me in clinic in 6 weeks with a renal ultrasound.    Disposition: Taken to the PACU in stable condition    Condition: Doing well without problems

## (undated) DEVICE — SHEATH URETERAL FLEXOR 12X28CM

## (undated) DEVICE — SOL NACL IRR 3000ML

## (undated) DEVICE — GUIDEWIRE STF STR .035X150CM

## (undated) DEVICE — BOWL STERILE LARGE 32OZ

## (undated) DEVICE — GLOVE SENSICARE PI SURG 6.5

## (undated) DEVICE — CATH POLLACK OPEN-END FLEXI-TI

## (undated) DEVICE — TOWEL OR DISP STRL BLUE 4/PK

## (undated) DEVICE — WIRE GUIDE 0.038OLD

## (undated) DEVICE — GLOVE SIGNATURE ESSNTL LTX 6

## (undated) DEVICE — GOWN POLY REINF BRTH SLV XL

## (undated) DEVICE — BASKET STONE RETRV 1.9FRX120CM

## (undated) DEVICE — SYS EASY CATCHER FLUID DRN

## (undated) DEVICE — DRAPE T CYSTOSCOPY STERILE

## (undated) DEVICE — CONTRAST ISOVUE 300 50ML

## (undated) DEVICE — URETEROSCOPE FLX 1.2X650X3.1MM

## (undated) DEVICE — SYR 10CC LUER LOCK

## (undated) DEVICE — TRAY SKIN SCRUB WET PREMIUM

## (undated) DEVICE — SPONGE COTTON TRAY 4X4IN

## (undated) DEVICE — COVER TABLE 44X90 STERILE

## (undated) DEVICE — DRAPE LEGGINGS CUFF 33X51IN

## (undated) DEVICE — SET CYSTO IRR DRP CHMBR 84IN

## (undated) DEVICE — GLOVE SENSICARE PI GRN 7

## (undated) DEVICE — GLOVE SENSICARE NEOPRENE 6.5